# Patient Record
Sex: FEMALE | Race: WHITE | Employment: OTHER | URBAN - METROPOLITAN AREA
[De-identification: names, ages, dates, MRNs, and addresses within clinical notes are randomized per-mention and may not be internally consistent; named-entity substitution may affect disease eponyms.]

---

## 2017-06-08 ENCOUNTER — OFFICE VISIT (OUTPATIENT)
Dept: INTERNAL MEDICINE | Age: 67
End: 2017-06-08
Payer: MEDICARE

## 2017-06-08 ENCOUNTER — NURSE ONLY (OUTPATIENT)
Dept: INTERNAL MEDICINE | Age: 67
End: 2017-06-08

## 2017-06-08 VITALS
BODY MASS INDEX: 26.62 KG/M2 | HEIGHT: 61 IN | HEART RATE: 96 BPM | DIASTOLIC BLOOD PRESSURE: 74 MMHG | WEIGHT: 141 LBS | SYSTOLIC BLOOD PRESSURE: 121 MMHG

## 2017-06-08 DIAGNOSIS — Z23 NEED FOR PNEUMOCOCCAL VACCINATION: ICD-10-CM

## 2017-06-08 DIAGNOSIS — Z00.00 ROUTINE GENERAL MEDICAL EXAMINATION AT A HEALTH CARE FACILITY: ICD-10-CM

## 2017-06-08 DIAGNOSIS — Z71.89 ACP (ADVANCE CARE PLANNING): Primary | ICD-10-CM

## 2017-06-08 DIAGNOSIS — Z78.0 ASYMPTOMATIC MENOPAUSAL STATE: ICD-10-CM

## 2017-06-08 DIAGNOSIS — Z13.9 SCREENING: ICD-10-CM

## 2017-06-08 DIAGNOSIS — Z11.4 SCREENING FOR HIV (HUMAN IMMUNODEFICIENCY VIRUS): ICD-10-CM

## 2017-06-08 DIAGNOSIS — Z13.6 SCREENING FOR CARDIOVASCULAR CONDITION: ICD-10-CM

## 2017-06-08 DIAGNOSIS — Z23 NEED FOR ZOSTAVAX ADMINISTRATION: ICD-10-CM

## 2017-06-08 DIAGNOSIS — R73.9 HYPERGLYCEMIA: Primary | ICD-10-CM

## 2017-06-08 DIAGNOSIS — Z13.1 SCREENING FOR DIABETES MELLITUS (DM): ICD-10-CM

## 2017-06-08 DIAGNOSIS — Z23 NEED FOR TETANUS BOOSTER: ICD-10-CM

## 2017-06-08 DIAGNOSIS — Z72.89 OTHER PROBLEMS RELATED TO LIFESTYLE: ICD-10-CM

## 2017-06-08 DIAGNOSIS — M25.50 ARTHRALGIA, UNSPECIFIED JOINT: ICD-10-CM

## 2017-06-08 LAB — HBA1C MFR BLD: 5.9 %

## 2017-06-08 PROCEDURE — 90670 PCV13 VACCINE IM: CPT | Performed by: INTERNAL MEDICINE

## 2017-06-08 PROCEDURE — G0009 ADMIN PNEUMOCOCCAL VACCINE: HCPCS | Performed by: INTERNAL MEDICINE

## 2017-06-08 PROCEDURE — G0402 INITIAL PREVENTIVE EXAM: HCPCS | Performed by: INTERNAL MEDICINE

## 2017-06-08 PROCEDURE — 83036 HEMOGLOBIN GLYCOSYLATED A1C: CPT | Performed by: INTERNAL MEDICINE

## 2017-06-08 ASSESSMENT — LIFESTYLE VARIABLES
HOW OFTEN DURING THE LAST YEAR HAVE YOU FAILED TO DO WHAT WAS NORMALLY EXPECTED FROM YOU BECAUSE OF DRINKING: 0
HOW OFTEN DURING THE LAST YEAR HAVE YOU FOUND THAT YOU WERE NOT ABLE TO STOP DRINKING ONCE YOU HAD STARTED: 0
HAS A RELATIVE, FRIEND, DOCTOR, OR ANOTHER HEALTH PROFESSIONAL EXPRESSED CONCERN ABOUT YOUR DRINKING OR SUGGESTED YOU CUT DOWN: 0
HOW OFTEN DURING THE LAST YEAR HAVE YOU HAD A FEELING OF GUILT OR REMORSE AFTER DRINKING: 0
HOW OFTEN DURING THE LAST YEAR HAVE YOU NEEDED AN ALCOHOLIC DRINK FIRST THING IN THE MORNING TO GET YOURSELF GOING AFTER A NIGHT OF HEAVY DRINKING: 0
AUDIT-C TOTAL SCORE: 2
AUDIT TOTAL SCORE: 2
HOW OFTEN DO YOU HAVE A DRINK CONTAINING ALCOHOL: 2
HOW OFTEN DURING THE LAST YEAR HAVE YOU BEEN UNABLE TO REMEMBER WHAT HAPPENED THE NIGHT BEFORE BECAUSE YOU HAD BEEN DRINKING: 0
HAVE YOU OR SOMEONE ELSE BEEN INJURED AS A RESULT OF YOUR DRINKING: 0
HOW MANY STANDARD DRINKS CONTAINING ALCOHOL DO YOU HAVE ON A TYPICAL DAY: 0
HOW OFTEN DO YOU HAVE SIX OR MORE DRINKS ON ONE OCCASION: 0

## 2017-06-08 ASSESSMENT — ANXIETY QUESTIONNAIRES: GAD7 TOTAL SCORE: 0

## 2017-07-05 ENCOUNTER — HOSPITAL ENCOUNTER (OUTPATIENT)
Dept: MAMMOGRAPHY | Age: 67
Discharge: HOME | End: 2017-07-05
Payer: MEDICARE

## 2017-07-05 DIAGNOSIS — Z78.0 ASYMPTOMATIC MENOPAUSAL STATE: ICD-10-CM

## 2017-07-05 PROCEDURE — 77080 DXA BONE DENSITY AXIAL: CPT

## 2017-12-06 ENCOUNTER — HOSPITAL ENCOUNTER (OUTPATIENT)
Age: 67
Setting detail: SPECIMEN
Discharge: HOME | End: 2017-12-06
Payer: MEDICARE

## 2017-12-06 DIAGNOSIS — Z13.6 SCREENING FOR CARDIOVASCULAR CONDITION: ICD-10-CM

## 2017-12-06 DIAGNOSIS — Z11.4 SCREENING FOR HIV (HUMAN IMMUNODEFICIENCY VIRUS): ICD-10-CM

## 2017-12-06 DIAGNOSIS — Z72.89 OTHER PROBLEMS RELATED TO LIFESTYLE: ICD-10-CM

## 2017-12-06 LAB
CHOLESTEROL/HDL RATIO: 2.7
CHOLESTEROL: 235 MG/DL
HDLC SERPL-MCNC: 86 MG/DL
HEPATITIS C ANTIBODY: NONREACTIVE
HIV AG/AB: NONREACTIVE
LDL CHOLESTEROL: 127 MG/DL (ref 0–130)
TRIGL SERPL-MCNC: 108 MG/DL
VLDLC SERPL CALC-MCNC: ABNORMAL MG/DL (ref 1–30)

## 2017-12-06 PROCEDURE — 36415 COLL VENOUS BLD VENIPUNCTURE: CPT

## 2017-12-06 PROCEDURE — 80061 LIPID PANEL: CPT

## 2017-12-06 PROCEDURE — 86803 HEPATITIS C AB TEST: CPT

## 2017-12-06 PROCEDURE — 87389 HIV-1 AG W/HIV-1&-2 AB AG IA: CPT

## 2017-12-13 ENCOUNTER — PATIENT MESSAGE (OUTPATIENT)
Dept: INTERNAL MEDICINE | Age: 67
End: 2017-12-13

## 2017-12-13 ENCOUNTER — OFFICE VISIT (OUTPATIENT)
Dept: INTERNAL MEDICINE | Age: 67
End: 2017-12-13
Payer: MEDICARE

## 2017-12-13 VITALS
HEART RATE: 79 BPM | DIASTOLIC BLOOD PRESSURE: 79 MMHG | SYSTOLIC BLOOD PRESSURE: 133 MMHG | HEIGHT: 61 IN | WEIGHT: 136 LBS | BODY MASS INDEX: 25.68 KG/M2

## 2017-12-13 DIAGNOSIS — R73.03 PREDIABETES: ICD-10-CM

## 2017-12-13 DIAGNOSIS — Z12.31 ENCOUNTER FOR SCREENING MAMMOGRAM FOR BREAST CANCER: ICD-10-CM

## 2017-12-13 DIAGNOSIS — D57.3 SICKLE CELL TRAIT (HCC): Primary | ICD-10-CM

## 2017-12-13 PROCEDURE — 1123F ACP DISCUSS/DSCN MKR DOCD: CPT | Performed by: INTERNAL MEDICINE

## 2017-12-13 PROCEDURE — 1090F PRES/ABSN URINE INCON ASSESS: CPT | Performed by: INTERNAL MEDICINE

## 2017-12-13 PROCEDURE — 99213 OFFICE O/P EST LOW 20 MIN: CPT | Performed by: INTERNAL MEDICINE

## 2017-12-13 PROCEDURE — G8419 CALC BMI OUT NRM PARAM NOF/U: HCPCS | Performed by: INTERNAL MEDICINE

## 2017-12-13 PROCEDURE — G8427 DOCREV CUR MEDS BY ELIG CLIN: HCPCS | Performed by: INTERNAL MEDICINE

## 2017-12-13 PROCEDURE — 4004F PT TOBACCO SCREEN RCVD TLK: CPT | Performed by: INTERNAL MEDICINE

## 2017-12-13 PROCEDURE — 4040F PNEUMOC VAC/ADMIN/RCVD: CPT | Performed by: INTERNAL MEDICINE

## 2017-12-13 PROCEDURE — G8484 FLU IMMUNIZE NO ADMIN: HCPCS | Performed by: INTERNAL MEDICINE

## 2017-12-13 PROCEDURE — G8399 PT W/DXA RESULTS DOCUMENT: HCPCS | Performed by: INTERNAL MEDICINE

## 2017-12-13 PROCEDURE — 3017F COLORECTAL CA SCREEN DOC REV: CPT | Performed by: INTERNAL MEDICINE

## 2017-12-13 PROCEDURE — 3014F SCREEN MAMMO DOC REV: CPT | Performed by: INTERNAL MEDICINE

## 2017-12-13 RX ORDER — TETANUS AND DIPHTHERIA TOXOIDS ADSORBED 2; 2 [LF]/.5ML; [LF]/.5ML
0.5 INJECTION INTRAMUSCULAR ONCE
Qty: 0.5 ML | Refills: 0 | Status: CANCELLED | OUTPATIENT
Start: 2017-12-13 | End: 2017-12-13

## 2017-12-13 NOTE — PATIENT INSTRUCTIONS
Please call clinic and schedule follow up in six (6) months, [phone], option 3.    -Order for mammogram faxed to scheduling, they will contact patient with an appt original order given to patient along with scheduling phone number     Vern Durant

## 2017-12-13 NOTE — PROGRESS NOTES
[facility]  [facility] [address] [facility]  [address]  Dept: [phone]      Today's Date: 12/13/2017  Patient Name: Orlando Carrion  Patient's age: 79 y.o., 1950  2[birth year]     CHIEF COMPLAINT:    Chief Complaint   Patient presents with    Diabetes     6 month follow up   [facility] Maintenance     Tdap, Zostavax, Flu, ands Mammogram due       History Obtained From:  patient    HISTORY OF PRESENT ILLNESS:      The patient is a 79 y.o. old  female and is here to Follow-up for sickle cell trait. She is doing well and denies any fatigue, leg swelling, chest pain or palpitation. She is on multivitamins and her A1c is 5.9. Patient Active Problem List   Diagnosis    Sickle cell trait ([facility] [address]       Past Medical History:   has a past medical history of Breast mass; MVP (mitral valve prolapse); and Wears glasses. Past Surgical History:   has a past surgical history that includes Tubal ligation; Tonsillectomy and adenoidectomy; Colonoscopy (06/08/2015); and other surgical history (Left, 06/08/2015). Medications:    Current Outpatient Prescriptions   Medication Sig Dispense Refill    Calcium-Magnesium-Vitamin D - MG-MG-UNIT TB24 Take 1 tablet by mouth daily 90 tablet 2    Glucosamine-Chondroitin-Vit D3 3089-8230-507 MG-MG-UNIT PACK Take 1 tablet by mouth daily 90 each 2    Multiple Vitamin (MULTI VITAMIN) TABS Take 1 tablet by mouth daily 90 tablet 1    ibuprofen (ADVIL;MOTRIN) 800 MG tablet Take 800 mg by mouth every 6 hours as needed for Pain      Psyllium-Calcium (METAMUCIL PLUS CALCIUM) CAPS Take 1 capsule by mouth daily Take with 8 oz water. Increase water intake. Increase to BID after 2 weeks 60 capsule 1    docusate sodium (COLACE) 100 MG capsule Take 1 capsule by mouth 2 times daily Take while on narcotics.   Decrease or discontinue for loose BMs 50 capsule 1    BREWERS YEAST PO Take by mouth daily WILL STOP 6/5/2015      vaccination in [facility]. Will update in chart. · Bert Quinones received counseling on the following healthy behaviors: exercise and medication adherence  · Discussed use, benefit, and side effects of prescribed medications. Barriers to medication compliance addressed. All patient questions answered. Pt voiced understanding. · The return visit should be in 6 months      Williams Cavanaugh MD  Attending and Faculty Internal Medicine  [address] [place] [address]  [address]  Dept: [phone]  12/13/17      This note is created with the assistance of a speech-recognition program. While intending to generate a document that actually reflects the content of the visit, the document can still have some mistakes which may not have been identified and corrected by editing.

## 2018-01-24 ENCOUNTER — HOSPITAL ENCOUNTER (OUTPATIENT)
Dept: MAMMOGRAPHY | Age: 68
Discharge: HOME | End: 2018-01-24
Payer: MEDICARE

## 2018-01-24 DIAGNOSIS — Z12.31 ENCOUNTER FOR SCREENING MAMMOGRAM FOR BREAST CANCER: ICD-10-CM

## 2018-01-24 PROCEDURE — 77063 BREAST TOMOSYNTHESIS BI: CPT

## 2018-01-26 DIAGNOSIS — R92.8 ABNORMAL MAMMOGRAM: Primary | ICD-10-CM

## 2018-01-31 ENCOUNTER — HOSPITAL ENCOUNTER (OUTPATIENT)
Dept: ULTRASOUND IMAGING | Age: 68
Discharge: HOME | End: 2018-02-02
Payer: MEDICARE

## 2018-01-31 ENCOUNTER — HOSPITAL ENCOUNTER (OUTPATIENT)
Dept: MAMMOGRAPHY | Age: 68
Discharge: HOME | End: 2018-02-02
Payer: MEDICARE

## 2018-01-31 DIAGNOSIS — R92.8 ABNORMAL MAMMOGRAM: ICD-10-CM

## 2018-01-31 PROCEDURE — G0279 TOMOSYNTHESIS, MAMMO: HCPCS

## 2019-02-20 ENCOUNTER — TELEPHONE (OUTPATIENT)
Dept: INTERNAL MEDICINE | Age: 69
End: 2019-02-20

## 2019-02-20 DIAGNOSIS — Z12.31 SCREENING MAMMOGRAM, ENCOUNTER FOR: ICD-10-CM

## 2019-02-20 DIAGNOSIS — R92.8 ABNORMAL MAMMOGRAM: Primary | ICD-10-CM

## 2019-07-03 ENCOUNTER — TELEPHONE (OUTPATIENT)
Dept: INTERNAL MEDICINE | Age: 69
End: 2019-07-03

## 2019-07-03 DIAGNOSIS — R92.8 ABNORMAL MAMMOGRAM: Primary | ICD-10-CM

## 2019-07-11 ENCOUNTER — HOSPITAL ENCOUNTER (OUTPATIENT)
Dept: MAMMOGRAPHY | Age: 69
Discharge: HOME | End: 2019-07-13
Payer: MEDICARE

## 2019-07-11 DIAGNOSIS — Z12.31 SCREENING MAMMOGRAM, ENCOUNTER FOR: ICD-10-CM

## 2019-07-11 PROCEDURE — 77063 BREAST TOMOSYNTHESIS BI: CPT

## 2020-07-31 ENCOUNTER — OFFICE VISIT (OUTPATIENT)
Dept: INTERNAL MEDICINE | Age: 70
End: 2020-07-31
Payer: MEDICARE

## 2020-07-31 ENCOUNTER — HOSPITAL ENCOUNTER (OUTPATIENT)
Age: 70
Setting detail: SPECIMEN
Discharge: HOME | End: 2020-07-31
Payer: MEDICARE

## 2020-07-31 VITALS — BODY MASS INDEX: 26.26 KG/M2 | WEIGHT: 139 LBS | SYSTOLIC BLOOD PRESSURE: 130 MMHG | DIASTOLIC BLOOD PRESSURE: 80 MMHG

## 2020-07-31 PROCEDURE — G8427 DOCREV CUR MEDS BY ELIG CLIN: HCPCS | Performed by: STUDENT IN AN ORGANIZED HEALTH CARE EDUCATION/TRAINING PROGRAM

## 2020-07-31 PROCEDURE — 4040F PNEUMOC VAC/ADMIN/RCVD: CPT | Performed by: STUDENT IN AN ORGANIZED HEALTH CARE EDUCATION/TRAINING PROGRAM

## 2020-07-31 PROCEDURE — G8417 CALC BMI ABV UP PARAM F/U: HCPCS | Performed by: STUDENT IN AN ORGANIZED HEALTH CARE EDUCATION/TRAINING PROGRAM

## 2020-07-31 PROCEDURE — 99213 OFFICE O/P EST LOW 20 MIN: CPT | Performed by: STUDENT IN AN ORGANIZED HEALTH CARE EDUCATION/TRAINING PROGRAM

## 2020-07-31 PROCEDURE — 3017F COLORECTAL CA SCREEN DOC REV: CPT | Performed by: STUDENT IN AN ORGANIZED HEALTH CARE EDUCATION/TRAINING PROGRAM

## 2020-07-31 PROCEDURE — 1090F PRES/ABSN URINE INCON ASSESS: CPT | Performed by: STUDENT IN AN ORGANIZED HEALTH CARE EDUCATION/TRAINING PROGRAM

## 2020-07-31 PROCEDURE — 1123F ACP DISCUSS/DSCN MKR DOCD: CPT | Performed by: STUDENT IN AN ORGANIZED HEALTH CARE EDUCATION/TRAINING PROGRAM

## 2020-07-31 PROCEDURE — 4004F PT TOBACCO SCREEN RCVD TLK: CPT | Performed by: STUDENT IN AN ORGANIZED HEALTH CARE EDUCATION/TRAINING PROGRAM

## 2020-07-31 PROCEDURE — G8399 PT W/DXA RESULTS DOCUMENT: HCPCS | Performed by: STUDENT IN AN ORGANIZED HEALTH CARE EDUCATION/TRAINING PROGRAM

## 2020-07-31 PROCEDURE — 99211 OFF/OP EST MAY X REQ PHY/QHP: CPT | Performed by: INTERNAL MEDICINE

## 2020-07-31 RX ORDER — LORATADINE 10 MG/1
10 TABLET ORAL NIGHTLY PRN
Qty: 30 TABLET | Refills: 2 | Status: SHIPPED | OUTPATIENT
Start: 2020-07-31 | End: 2021-09-30 | Stop reason: ALTCHOICE

## 2020-07-31 ASSESSMENT — PATIENT HEALTH QUESTIONNAIRE - PHQ9
SUM OF ALL RESPONSES TO PHQ QUESTIONS 1-9: 0
SUM OF ALL RESPONSES TO PHQ9 QUESTIONS 1 & 2: 0
2. FEELING DOWN, DEPRESSED OR HOPELESS: 0
1. LITTLE INTEREST OR PLEASURE IN DOING THINGS: 0
SUM OF ALL RESPONSES TO PHQ QUESTIONS 1-9: 0

## 2020-07-31 NOTE — PATIENT INSTRUCTIONS
Return To Clinic 8/14/2020 . After Visit Summary  given and reviewed. It is very important for your care that you keep your appointment. If for some reason you are unable to keep your appointment it is equally important that you call our office at [phone] to cancel your appointment and reschedule.  Failure to do so may result in your termination from our practice.     -Bloodwork orders given to patient, they will have them done before their next visit.     -Yamile Umana

## 2020-07-31 NOTE — PROGRESS NOTES
hours as needed for Pain      Psyllium-Calcium (METAMUCIL PLUS CALCIUM) CAPS Take 1 capsule by mouth daily Take with 8 oz water. Increase water intake. Increase to BID after 2 weeks 60 capsule 1    docusate sodium (COLACE) 100 MG capsule Take 1 capsule by mouth 2 times daily Take while on narcotics. Decrease or discontinue for loose BMs 50 capsule 1    BREWERS YEAST PO Take by mouth daily WILL STOP 6/5/2015       No current facility-administered medications on file prior to visit. SOCIAL HISTORY    Reviewed and no change from previous record. Kelsey Kaplan  reports that she has been smoking cigarettes.  She has never used smokeless tobacco.    FAMILY HISTORY:    Reviewed and No change from previous visit    REVIEW OF SYSTEMS:    ENT: See HPI  Respiratory: no cough, shortness of breath, or wheezing  Cardiovascular: no chest pain or dyspnea on exertion  Gastrointestinal: no abdominal pain, black or bloody stools  Neurological: no TIA or stroke symptoms  Endocrine: negative  Genito-Urinary: no dysuria, trouble voiding, or hematuria  Musculoskeletal: negative  Dermatological: negative    PHYSICAL EXAM:      Vitals:    07/31/20 1342   BP: (!) 147/90     General appearance - alert, well appearing, and in no distress  Mental status - alert, oriented to person, place, and time  Eyes - pupils equal and reactive, extraocular eye movements intact  Nose - normal and patent, no erythema, discharge or polyps  Chest - clear to auscultation, no wheezes, rales or rhonchi, symmetric air entry  Heart - normal rate, regular rhythm, normal S1, S2, no murmurs, rubs, clicks or gallops  Abdomen - soft, nontender, nondistended, no masses or organomegaly  Musculoskeletal - no joint tenderness, deformity or swelling  Extremities - peripheral pulses normal, no pedal edema, no clubbing or cyanosis  Skin - normal coloration and turgor, no rashes, no suspicious skin lesions noted    LABORATORY FINDINGS:    CBC:  Lab Results   Component Value Date    WBC 7.7 01/19/2015    HGB 13.1 01/19/2015     01/19/2015     BMP:    Lab Results   Component Value Date     01/19/2015    K 3.9 01/19/2015     01/19/2015    CO2 24 01/19/2015    BUN 12 01/19/2015    CREATININE 0.60 01/19/2015    GLUCOSE 98 01/19/2015     HEMOGLOBIN A1C:   Lab Results   Component Value Date    LABA1C 5.9 06/08/2017     MICROALBUMIN URINE: No results found for: MICROALBUR  FASTING LIPID PANEL:  Lab Results   Component Value Date    CHOL 235 (H) 12/06/2017    HDL 86 12/06/2017    TRIG 108 12/06/2017     LIVER PROFILE:No results found for: ALT, AST, PROT, BILITOT, BILIDIR, LABALBU   THYROID FUNCTION:   Lab Results   Component Value Date    TSH 2.01 02/27/2015      URINE ANALYSIS: No results found for: LABURIN  ASSESSMENT AND PLAN:    1. Allergic rhinoconjunctivitis    - loratadine (CLARITIN) 10 MG tablet; Take 1 tablet by mouth nightly as needed (for congestion, rhinorrhea and tearing during an allergic reation)  Dispense: 30 tablet; Refill: 2    2. Suspected COVID-19 virus infection    - COVID-19 Ambulatory; Future      FOLLOW UP:       1. Esperanza Wadsworth received counseling on the following healthy behaviors: avoid going to places that trigger allergic reactions. 2.  Patient given educational materials - see patient instructions  3. Discussed use, benefit, and side effects of prescribed medications. Barriers to medication compliance addressed. All patient questions answered. Pt voiced understanding. 4.  Reviewed prior labs and health maintenance  5. Continue current medications, diet and exercise.       Osmel Deutsch MD  PGY-3 Resident  Internal Medicine  [facility]    7/31/2020, 2:04 PM

## 2020-08-03 LAB — SARS-COV-2, NAA: NOT DETECTED

## 2020-08-04 ENCOUNTER — TELEPHONE (OUTPATIENT)
Dept: PRIMARY CARE CLINIC | Age: 70
End: 2020-08-04

## 2020-08-14 ENCOUNTER — OFFICE VISIT (OUTPATIENT)
Dept: INTERNAL MEDICINE | Age: 70
End: 2020-08-14
Payer: MEDICARE

## 2020-08-14 VITALS
WEIGHT: 137 LBS | BODY MASS INDEX: 25.86 KG/M2 | DIASTOLIC BLOOD PRESSURE: 72 MMHG | HEIGHT: 61 IN | HEART RATE: 85 BPM | SYSTOLIC BLOOD PRESSURE: 118 MMHG

## 2020-08-14 LAB — HBA1C MFR BLD: 5.7 %

## 2020-08-14 PROCEDURE — 3017F COLORECTAL CA SCREEN DOC REV: CPT | Performed by: STUDENT IN AN ORGANIZED HEALTH CARE EDUCATION/TRAINING PROGRAM

## 2020-08-14 PROCEDURE — 99213 OFFICE O/P EST LOW 20 MIN: CPT | Performed by: STUDENT IN AN ORGANIZED HEALTH CARE EDUCATION/TRAINING PROGRAM

## 2020-08-14 PROCEDURE — 1123F ACP DISCUSS/DSCN MKR DOCD: CPT | Performed by: STUDENT IN AN ORGANIZED HEALTH CARE EDUCATION/TRAINING PROGRAM

## 2020-08-14 PROCEDURE — G8427 DOCREV CUR MEDS BY ELIG CLIN: HCPCS | Performed by: STUDENT IN AN ORGANIZED HEALTH CARE EDUCATION/TRAINING PROGRAM

## 2020-08-14 PROCEDURE — 1090F PRES/ABSN URINE INCON ASSESS: CPT | Performed by: STUDENT IN AN ORGANIZED HEALTH CARE EDUCATION/TRAINING PROGRAM

## 2020-08-14 PROCEDURE — 83037 HB GLYCOSYLATED A1C HOME DEV: CPT | Performed by: STUDENT IN AN ORGANIZED HEALTH CARE EDUCATION/TRAINING PROGRAM

## 2020-08-14 PROCEDURE — 4040F PNEUMOC VAC/ADMIN/RCVD: CPT | Performed by: STUDENT IN AN ORGANIZED HEALTH CARE EDUCATION/TRAINING PROGRAM

## 2020-08-14 PROCEDURE — G8399 PT W/DXA RESULTS DOCUMENT: HCPCS | Performed by: STUDENT IN AN ORGANIZED HEALTH CARE EDUCATION/TRAINING PROGRAM

## 2020-08-14 PROCEDURE — 4004F PT TOBACCO SCREEN RCVD TLK: CPT | Performed by: STUDENT IN AN ORGANIZED HEALTH CARE EDUCATION/TRAINING PROGRAM

## 2020-08-14 PROCEDURE — G8417 CALC BMI ABV UP PARAM F/U: HCPCS | Performed by: STUDENT IN AN ORGANIZED HEALTH CARE EDUCATION/TRAINING PROGRAM

## 2020-08-14 PROCEDURE — 99211 OFF/OP EST MAY X REQ PHY/QHP: CPT | Performed by: INTERNAL MEDICINE

## 2020-08-14 NOTE — PROGRESS NOTES
Attending Physician Statement  I have discussed the care of Rj Castaneda including pertinent history and exam findings,  with the resident. I have reviewed the key elements of all parts of the encounter with the resident. I agree with the assessment, plan and orders as documented by the resident.   (GE Modifier)    Dennie Redhead, MD FASN  Attending Physician, [address] [place] [address], Internal Medicine Residency Program  [address] [place] [address]  8/14/2020, 4:09 PM

## 2020-08-14 NOTE — PATIENT INSTRUCTIONS
Referral for GI sent to [facility]  they will call pt for appt, copy of referral with number and address given to pt. Pt should call referral number if not heard from within a couple of weeks. An After Visit Summary was printed and given to the patient.   CB

## 2020-08-14 NOTE — PROGRESS NOTES
[facility]    Progress Note    Date of patient's visit: 8/14/2020  YOB: 1950  Patient's Name:  Valerie Whitman    Patient Care Team:  Brandon Mike MD as PCP - General (Internal Medicine)  Angeline Shields DO as Consulting Physician (General Surgery)    REASON FOR VISIT: New to provider visit    HISTORY OF PRESENT ILLNESS:    History was obtained from the patient. Valerie Whitman is a 71 y.o. is here for a new to provider visit. She has no complaints today. She continues to smoke over half pack of cigarettes per week. She drinks about 3 glasses of wine in 1 week. She exercises regularly. She is previously been seen for sickle cell trait and prediabetes. Her former PCP was Dr.N Shasha Douglass. Blood pressure is within normal limits. Patient Active Problem List   Diagnosis    Sickle cell trait (HCC)    Prediabetes       ALLERGIES    No Known Allergies      MEDICATIONS:      Current Outpatient Medications on File Prior to Visit   Medication Sig Dispense Refill    Calcium-Magnesium-Vitamin D - MG-MG-UNIT TB24 Take 1 tablet by mouth daily 90 tablet 2    Multiple Vitamin (MULTI VITAMIN) TABS Take 1 tablet by mouth daily 90 tablet 1    loratadine (CLARITIN) 10 MG tablet Take 1 tablet by mouth nightly as needed (for congestion, rhinorrhea and tearing during an allergic reation) (Patient not taking: Reported on 8/14/2020) 30 tablet 2    Glucosamine-Chondroitin-Vit D3 2898-7802-637 MG-MG-UNIT PACK Take 1 tablet by mouth daily (Patient not taking: Reported on 8/14/2020) 90 each 2    ibuprofen (ADVIL;MOTRIN) 800 MG tablet Take 800 mg by mouth every 6 hours as needed for Pain      Psyllium-Calcium (METAMUCIL PLUS CALCIUM) CAPS Take 1 capsule by mouth daily Take with 8 oz water. Increase water intake.   Increase to BID after 2 weeks (Patient not taking: Reported on 8/14/2020) 60 capsule 1    docusate sodium (COLACE) 100 MG capsule Take 1 capsule by mouth 2 times daily Take while on narcotics. Decrease or discontinue for loose BMs (Patient not taking: Reported on 8/14/2020) 50 capsule 1    BREWERS YEAST PO Take by mouth daily WILL STOP 6/5/2015       No current facility-administered medications on file prior to visit. SOCIAL HISTORY    Reviewed and no change from previous record. Alex Richter  reports that she has been smoking cigarettes. She has a 10.00 pack-year smoking history. She has never used smokeless tobacco.    FAMILY HISTORY:    Reviewed and No change from previous visit    REVIEW OF SYSTEMS:    ENT: negative  Respiratory: no cough, shortness of breath, or wheezing  Cardiovascular: no chest pain or dyspnea on exertion  Gastrointestinal: no abdominal pain, black or bloody stools  Neurological: no TIA or stroke symptoms  Endocrine: negative  Genito-Urinary: no dysuria, trouble voiding, or hematuria  Musculoskeletal: negative  Dermatological: Healing wound on the right ankle after being bitten by yellow jacket bees. PHYSICAL EXAM:      Vitals:    08/14/20 1405   BP: 118/72   Pulse: 85     General appearance - alert, well appearing, and in no distress  Mental status - alert, oriented to person, place, and time  Eyes - pupils equal and reactive, extraocular eye movements intact  Chest - clear to auscultation, no wheezes, rales or rhonchi, symmetric air entry  Heart - normal rate, regular rhythm, normal S1, S2, no murmurs, rubs, clicks or gallops  Abdomen - soft, nontender, nondistended, no masses or organomegaly  Musculoskeletal - no joint tenderness, deformity or swelling  Extremities - peripheral pulses normal, no pedal edema, no clubbing or cyanosis  Skin -scab covered wound over lateral aspect of the right ankle. No discharge or tenderness.     LABORATORY FINDINGS:    CBC:  Lab Results   Component Value Date    WBC 7.7 01/19/2015    HGB 13.1 01/19/2015     01/19/2015     BMP:    Lab Results   Component Value Date

## 2020-09-01 ENCOUNTER — VIRTUAL VISIT (OUTPATIENT)
Dept: INTERNAL MEDICINE | Age: 70
End: 2020-09-01
Payer: MEDICARE

## 2020-09-01 PROCEDURE — G0438 PPPS, INITIAL VISIT: HCPCS | Performed by: NURSE PRACTITIONER

## 2020-09-01 ASSESSMENT — LIFESTYLE VARIABLES
HOW OFTEN DURING THE LAST YEAR HAVE YOU BEEN UNABLE TO REMEMBER WHAT HAPPENED THE NIGHT BEFORE BECAUSE YOU HAD BEEN DRINKING: 0
HAVE YOU OR SOMEONE ELSE BEEN INJURED AS A RESULT OF YOUR DRINKING: 0
HOW MANY STANDARD DRINKS CONTAINING ALCOHOL DO YOU HAVE ON A TYPICAL DAY: 0
HAS A RELATIVE, FRIEND, DOCTOR, OR ANOTHER HEALTH PROFESSIONAL EXPRESSED CONCERN ABOUT YOUR DRINKING OR SUGGESTED YOU CUT DOWN: 0
HOW OFTEN DO YOU HAVE SIX OR MORE DRINKS ON ONE OCCASION: 0
AUDIT TOTAL SCORE: 3
HOW OFTEN DURING THE LAST YEAR HAVE YOU NEEDED AN ALCOHOLIC DRINK FIRST THING IN THE MORNING TO GET YOURSELF GOING AFTER A NIGHT OF HEAVY DRINKING: 0
HOW OFTEN DURING THE LAST YEAR HAVE YOU FOUND THAT YOU WERE NOT ABLE TO STOP DRINKING ONCE YOU HAD STARTED: 0
HOW OFTEN DURING THE LAST YEAR HAVE YOU FAILED TO DO WHAT WAS NORMALLY EXPECTED FROM YOU BECAUSE OF DRINKING: 0
AUDIT-C TOTAL SCORE: 3
HOW OFTEN DO YOU HAVE A DRINK CONTAINING ALCOHOL: 3
HOW OFTEN DURING THE LAST YEAR HAVE YOU HAD A FEELING OF GUILT OR REMORSE AFTER DRINKING: 0

## 2020-09-01 ASSESSMENT — PATIENT HEALTH QUESTIONNAIRE - PHQ9
SUM OF ALL RESPONSES TO PHQ QUESTIONS 1-9: 0
SUM OF ALL RESPONSES TO PHQ QUESTIONS 1-9: 0

## 2020-09-01 NOTE — PATIENT INSTRUCTIONS
Personalized Preventive Plan for Eyal Calvo - 9/1/2020  Medicare offers a range of preventive health benefits. Some of the tests and screenings are paid in full while other may be subject to a deductible, co-insurance, and/or copay. Some of these benefits include a comprehensive review of your medical history including lifestyle, illnesses that may run in your family, and various assessments and screenings as appropriate. After reviewing your medical record and screening and assessments performed today your provider may have ordered immunizations, labs, imaging, and/or referrals for you. A list of these orders (if applicable) as well as your Preventive Care list are included within your After Visit Summary for your review. Other Preventive Recommendations:    · A preventive eye exam performed by an eye specialist is recommended every 1-2 years to screen for glaucoma; cataracts, macular degeneration, and other eye disorders. · A preventive dental visit is recommended every 6 months. · Try to get at least 150 minutes of exercise per week or 10,000 steps per day on a pedometer . · Order or download the FREE \"Exercise & Physical Activity: Your Everyday Guide\" from The [employer] on [employer]. Call 5-706.301.2433 or search The [employer] Aging online. · You need 8329-2346 mg of calcium and 8866-0049 IU of vitamin D per day. It is possible to meet your calcium requirement with diet alone, but a vitamin D supplement is usually necessary to meet this goal.  · When exposed to the sun, use a sunscreen that protects against both UVA and UVB radiation with an SPF of 30 or greater. Reapply every 2 to 3 hours or after sweating, drying off with a towel, or swimming. · Always wear a seat belt when traveling in a car. Always wear a helmet when riding a bicycle or motorcycle.       Patient was put on a wait list and will be contacted to schedule their next follow up appointment once the schedule is available. If the patient is in need of an appointment before their next visit please call the office at [phone]. After Visit Summary  mailed to patient.     SL

## 2020-09-01 NOTE — PROGRESS NOTES
Diagnosis Date    Breast mass     LEFT    MVP (mitral valve prolapse)     Wears glasses        Past Surgical History:   Procedure Laterality Date    COLONOSCOPY  06/08/2015    OTHER SURGICAL HISTORY Left 06/08/2015    biopsy of left breast tissue    TONSILLECTOMY AND ADENOIDECTOMY      TUBAL LIGATION           Family History   Problem Relation Age of Onset    Other Father         throat cancer    Hypertension Mother     Diabetes Mother        CareTeam (Including outside providers/suppliers regularly involved in providing care):   Patient Care Team:  Lia Hayward MD as PCP - General (Internal Medicine)  Jama Zuluaga DO as Consulting Physician (General Surgery)    Wt Readings from Last 3 Encounters:   08/14/20 137 lb (62.1 kg)   07/31/20 139 lb (63 kg)   12/13/17 136 lb (61.7 kg)      No flowsheet data found. There is no height or weight on file to calculate BMI. Based upon direct observation of the patient, evaluation of cognition reveals recent and remote memory intact. Wt Readings from Last 3 Encounters:   08/14/20 137 lb (62.1 kg)   07/31/20 139 lb (63 kg)   12/13/17 136 lb (61.7 kg)     Temp Readings from Last 3 Encounters:   06/24/15 99 °F (37.2 °C) (Temporal)   06/08/15 97.7 °F (36.5 °C)   05/13/15 98.1 °F (36.7 °C) (Oral)     BP Readings from Last 3 Encounters:   08/14/20 118/72   07/31/20 130/80   12/13/17 133/79     Pulse Readings from Last 3 Encounters:   08/14/20 85   12/13/17 79   06/08/17 96       Patient's complete Health Risk Assessment and screening values have been reviewed and are found in Flowsheets. The following problems were reviewed today and where indicated follow up appointments were made and/or referrals ordered.     Positive Risk Factor Screenings with Interventions:     Substance Abuse:  Social History     Tobacco History     Smoking Status  Current Some Day Smoker Smoking Frequency  0.25 packs/day for 40 years (10 pk yrs) Smoking Tobacco Type  Cigarettes 72 yrs and older) 12/18/2018    Pneumococcal Conjugate 13-valent ([facility] [place]) 06/08/2017, 10/18/2019    Tdap (Boostrix, Adacel) 06/12/2017    Zoster Live (Zostavax) 06/27/2017        Health Maintenance   Topic Date Due    Shingles Vaccine (2 of 3) 08/22/2017    Colon cancer screen colonoscopy  06/08/2018    Annual Wellness Visit (AWV)  06/23/2019    Pneumococcal 65+ years Vaccine (2 of 2 - PPSV23) 10/18/2020    Breast cancer screen  07/11/2021    A1C test (Diabetic or Prediabetic)  08/14/2021    Lipid screen  12/06/2022    DTaP/Tdap/Td vaccine (2 - Td) 06/12/2027    DEXA (modify frequency per FRAX score)  Completed    Flu vaccine  Completed    Hepatitis C screen  Completed    Hepatitis A vaccine  Aged Out    Hepatitis B vaccine  Aged Out    Hib vaccine  Aged Out    Meningococcal (ACWY) vaccine  Aged Out     Recommendations for [employer] Due: see orders and patient instructions/AVS.  . Recommended screening schedule for the next 5-10 years is provided to the patient in written form: see Patient Instructions/AVS.    Pt reports having order for colonoscopy with plans to call and schedule. Discussed completing shingles vaccine and she is not interested at this time. Blanca Oneil was seen today for medicare awv and health maintenance. Diagnoses and all orders for this visit:    Routine general medical examination at a health care facility  -     [address] [place] [address] is a 71 y.o. female being evaluated by a Virtual Visit (phone) encounter to address concerns as mentioned above. A caregiver was present when appropriate. Due to this being a TeleHealth encounter ([facility] public health emergency), evaluation of the following organ systems was limited: Vitals/Constitutional/EENT/Resp/CV/GI//MS/Neuro/Skin/Heme-Lymph-Imm.   Pursuant to the emergency declaration under the [facility] waiver authority and the Coronavirus Preparedness and Response Supplemental Appropriations Act, this Virtual Visit was conducted with patient's (and/or legal guardian's) consent, to reduce the patient's risk of exposure to COVID-19 and provide necessary medical care. The patient (and/or legal guardian) has also been advised to contact this office for worsening conditions or problems, and seek emergency medical treatment and/or call 911 if deemed necessary. Patient identification was verified at the start of the visit: Yes    Services were provided through phone to substitute for in-person clinic visit. Patient and provider were located at their individual homes. --ANA Mendez CNP on 9/1/2020 at 10:05 AM    An electronic signature was used to authenticate this note.

## 2021-02-01 ENCOUNTER — TELEPHONE (OUTPATIENT)
Dept: INTERNAL MEDICINE | Age: 71
End: 2021-02-01

## 2021-09-30 ENCOUNTER — VIRTUAL VISIT (OUTPATIENT)
Dept: INTERNAL MEDICINE | Age: 71
End: 2021-09-30
Payer: MEDICARE

## 2021-09-30 DIAGNOSIS — Z12.31 BREAST CANCER SCREENING BY MAMMOGRAM: Primary | ICD-10-CM

## 2021-09-30 DIAGNOSIS — Z12.11 SCREEN FOR COLON CANCER: ICD-10-CM

## 2021-09-30 DIAGNOSIS — Z00.00 ROUTINE GENERAL MEDICAL EXAMINATION AT A HEALTH CARE FACILITY: ICD-10-CM

## 2021-09-30 PROCEDURE — 3017F COLORECTAL CA SCREEN DOC REV: CPT | Performed by: NURSE PRACTITIONER

## 2021-09-30 PROCEDURE — 1123F ACP DISCUSS/DSCN MKR DOCD: CPT | Performed by: NURSE PRACTITIONER

## 2021-09-30 PROCEDURE — G0439 PPPS, SUBSEQ VISIT: HCPCS | Performed by: NURSE PRACTITIONER

## 2021-09-30 PROCEDURE — 4040F PNEUMOC VAC/ADMIN/RCVD: CPT | Performed by: NURSE PRACTITIONER

## 2021-09-30 SDOH — ECONOMIC STABILITY: FOOD INSECURITY: WITHIN THE PAST 12 MONTHS, YOU WORRIED THAT YOUR FOOD WOULD RUN OUT BEFORE YOU GOT MONEY TO BUY MORE.: NEVER TRUE

## 2021-09-30 SDOH — ECONOMIC STABILITY: FOOD INSECURITY: WITHIN THE PAST 12 MONTHS, THE FOOD YOU BOUGHT JUST DIDN'T LAST AND YOU DIDN'T HAVE MONEY TO GET MORE.: NEVER TRUE

## 2021-09-30 ASSESSMENT — PATIENT HEALTH QUESTIONNAIRE - PHQ9
SUM OF ALL RESPONSES TO PHQ QUESTIONS 1-9: 0
2. FEELING DOWN, DEPRESSED OR HOPELESS: 0
SUM OF ALL RESPONSES TO PHQ QUESTIONS 1-9: 0
10. IF YOU CHECKED OFF ANY PROBLEMS, HOW DIFFICULT HAVE THESE PROBLEMS MADE IT FOR YOU TO DO YOUR WORK, TAKE CARE OF THINGS AT HOME, OR GET ALONG WITH OTHER PEOPLE: 0
9. THOUGHTS THAT YOU WOULD BE BETTER OFF DEAD, OR OF HURTING YOURSELF: 0
7. TROUBLE CONCENTRATING ON THINGS, SUCH AS READING THE NEWSPAPER OR WATCHING TELEVISION: 0
DEPRESSION UNABLE TO ASSESS: PT REFUSES
3. TROUBLE FALLING OR STAYING ASLEEP: 0
5. POOR APPETITE OR OVEREATING: 0
4. FEELING TIRED OR HAVING LITTLE ENERGY: 0
8. MOVING OR SPEAKING SO SLOWLY THAT OTHER PEOPLE COULD HAVE NOTICED. OR THE OPPOSITE, BEING SO FIGETY OR RESTLESS THAT YOU HAVE BEEN MOVING AROUND A LOT MORE THAN USUAL: 0
SUM OF ALL RESPONSES TO PHQ9 QUESTIONS 1 & 2: 0
SUM OF ALL RESPONSES TO PHQ QUESTIONS 1-9: 0
1. LITTLE INTEREST OR PLEASURE IN DOING THINGS: 0
6. FEELING BAD ABOUT YOURSELF - OR THAT YOU ARE A FAILURE OR HAVE LET YOURSELF OR YOUR FAMILY DOWN: 0

## 2021-09-30 ASSESSMENT — LIFESTYLE VARIABLES
HOW MANY STANDARD DRINKS CONTAINING ALCOHOL DO YOU HAVE ON A TYPICAL DAY: 0
HOW OFTEN DO YOU HAVE SIX OR MORE DRINKS ON ONE OCCASION: 0
AUDIT TOTAL SCORE: 1
HAVE YOU OR SOMEONE ELSE BEEN INJURED AS A RESULT OF YOUR DRINKING: 0
HOW OFTEN DO YOU HAVE A DRINK CONTAINING ALCOHOL: 1
AUDIT-C TOTAL SCORE: 1
HOW OFTEN DURING THE LAST YEAR HAVE YOU FOUND THAT YOU WERE NOT ABLE TO STOP DRINKING ONCE YOU HAD STARTED: 0
HOW OFTEN DURING THE LAST YEAR HAVE YOU HAD A FEELING OF GUILT OR REMORSE AFTER DRINKING: 0
HOW OFTEN DURING THE LAST YEAR HAVE YOU NEEDED AN ALCOHOLIC DRINK FIRST THING IN THE MORNING TO GET YOURSELF GOING AFTER A NIGHT OF HEAVY DRINKING: 0
HAS A RELATIVE, FRIEND, DOCTOR, OR ANOTHER HEALTH PROFESSIONAL EXPRESSED CONCERN ABOUT YOUR DRINKING OR SUGGESTED YOU CUT DOWN: 0
HOW OFTEN DURING THE LAST YEAR HAVE YOU FAILED TO DO WHAT WAS NORMALLY EXPECTED FROM YOU BECAUSE OF DRINKING: 0
HOW OFTEN DURING THE LAST YEAR HAVE YOU BEEN UNABLE TO REMEMBER WHAT HAPPENED THE NIGHT BEFORE BECAUSE YOU HAD BEEN DRINKING: 0

## 2021-09-30 ASSESSMENT — SOCIAL DETERMINANTS OF HEALTH (SDOH): HOW HARD IS IT FOR YOU TO PAY FOR THE VERY BASICS LIKE FOOD, HOUSING, MEDICAL CARE, AND HEATING?: NOT HARD AT ALL

## 2021-09-30 NOTE — PATIENT INSTRUCTIONS
Personalized Preventive Plan for Zeina Samuels - 9/30/2021  Medicare offers a range of preventive health benefits. Some of the tests and screenings are paid in full while other may be subject to a deductible, co-insurance, and/or copay. Some of these benefits include a comprehensive review of your medical history including lifestyle, illnesses that may run in your family, and various assessments and screenings as appropriate. After reviewing your medical record and screening and assessments performed today your provider may have ordered immunizations, labs, imaging, and/or referrals for you. A list of these orders (if applicable) as well as your Preventive Care list are included within your After Visit Summary for your review. Other Preventive Recommendations:    · A preventive eye exam performed by an eye specialist is recommended every 1-2 years to screen for glaucoma; cataracts, macular degeneration, and other eye disorders. · A preventive dental visit is recommended every 6 months. · Try to get at least 150 minutes of exercise per week or 10,000 steps per day on a pedometer . · Order or download the FREE \"Exercise & Physical Activity: Your Everyday Guide\" from The "[employer]. Call 5-623.258.4803 or search The [employer] Data on Aging online. · You need 5675-2965 mg of calcium and 1879-7110 IU of vitamin D per day. It is possible to meet your calcium requirement with diet alone, but a vitamin D supplement is usually necessary to meet this goal.  · When exposed to the sun, use a sunscreen that protects against both UVA and UVB radiation with an SPF of 30 or greater. Reapply every 2 to 3 hours or after sweating, drying off with a towel, or swimming. · Always wear a seat belt when traveling in a car. Always wear a helmet when riding a bicycle or motorcycle.

## 2021-09-30 NOTE — PROGRESS NOTES
Medicare Annual Wellness Visit  Are Name: Sabrina Lopez Date: 2021   MRN: [id number] Sex: Female   Age: 70 y.o. Ethnicity: Non- / Non    : 1950 Race: White (non-)      Connie Crawford is here for Medicare AWV (vaccines declined )    Screenings for behavioral, psychosocial and functional/safety risks, and cognitive dysfunction are all negative except as indicated below. These results, as well as other patient data from the [address] form, are documented in Flowsheets linked to this Encounter. No Known Allergies      Prior to Visit Medications    Medication Sig Taking? Authorizing Provider   Calcium-Magnesium-Vitamin D - MG-MG-UNIT TB24 Take 1 tablet by mouth daily Yes Doug Dumont MD   Multiple Vitamin (MULTI VITAMIN) TABS Take 1 tablet by mouth daily Yes Doug Dumont MD   ibuprofen (ADVIL;MOTRIN) 800 MG tablet Take 800 mg by mouth every 6 hours as needed for Pain Yes Historical Provider, MD   Psyllium-Calcium (METAMUCIL PLUS CALCIUM) CAPS Take 1 capsule by mouth daily Take with 8 oz water. Increase water intake.   Increase to BID after 2 weeks Yes Sergio Treviño DO         Past Medical History:   Diagnosis Date    Breast mass     LEFT    MVP (mitral valve prolapse)     Wears glasses        Past Surgical History:   Procedure Laterality Date    COLONOSCOPY  2015    OTHER SURGICAL HISTORY Left 2015    biopsy of left breast tissue    TONSILLECTOMY AND ADENOIDECTOMY      TUBAL LIGATION           Family History   Problem Relation Age of Onset    Other Father         throat cancer    Hypertension Mother     Diabetes Mother        CareTeam (Including outside providers/suppliers regularly involved in providing care):   Patient Care Team:  Marta Ruggiero MD as PCP - General (Internal Medicine)  ANA Goodman Sa - CNP as PCP - Marcela Thompsonled Provider  Orestes Huff DO as Consulting Physician (General Surgery)    Blayne Readings from Last 3 Encounters:   08/14/20 137 lb (62.1 kg)   07/31/20 139 lb (63 kg)   12/13/17 136 lb (61.7 kg)      Patient-Reported Vitals 9/30/2021   Patient-Reported Weight 138 lb   Patient-Reported Systolic 400   Patient-Reported Diastolic 80      There is no height or weight on file to calculate BMI. Based upon direct observation of the patient, evaluation of cognition reveals recent and remote memory intact. Wt Readings from Last 3 Encounters:   08/14/20 137 lb (62.1 kg)   07/31/20 139 lb (63 kg)   12/13/17 136 lb (61.7 kg)     Temp Readings from Last 3 Encounters:   06/24/15 99 °F (37.2 °C) (Temporal)   06/08/15 97.7 °F (36.5 °C)   05/13/15 98.1 °F (36.7 °C) (Oral)     BP Readings from Last 3 Encounters:   08/14/20 118/72   07/31/20 130/80   12/13/17 133/79     Pulse Readings from Last 3 Encounters:   08/14/20 85   12/13/17 79   06/08/17 96       Patient's complete Health Risk Assessment and screening values have been reviewed and are found in Flowsheets. The following problems were reviewed today and where indicated follow up appointments were made and/or referrals ordered.     Positive Risk Factor Screenings with Interventions:       Depression:  Depression Unable to Assess: Pt refuses  PHQ-2 Score: 0  PHQ-9 Total Score: 0    Severity:1-4 = minimal depression, 5-9 = mild depression, 10-14 = moderate depression, 15-19 = moderately severe depression, 20-27 = severe depression     Substance History:  Social History     Tobacco History     Smoking Status  Current Some Day Smoker Smoking Frequency  0.25 packs/day for 40 years (10 pk yrs) Smoking Tobacco Type  Cigarettes    Smokeless Tobacco Use  Never Used    Tobacco Comment  using e ciggs/ sometimes actual cig          Alcohol History     Alcohol Use Status  Yes Drinks/Week  3 Standard drinks or equivalent per week Amount  2.5 standard drinks of alcohol/wk Comment  wine          Drug Use     Drug Use Status  No          Sexual Activity     Sexually Active  Yes               Alcohol Screening: Audit-C Score: 1  Total Score: 1    A score of 8 or more is associated with harmful or hazardous drinking. A score of 13 or more in women, and 15 or more in men, is likely to indicate alcohol dependence. Substance Abuse Interventions:  · Tobacco abuse:  patient is not ready to work toward tobacco cessation at this time    [address] and ACP:  General  In general, how would you say your health is?: Very Good  In the past 7 days, have you experienced any of the following?  New or Increased Pain, New or Increased Fatigue, Loneliness, Social Isolation, Stress or Anger?: None of These  Do you get the social and emotional support that you need?: Yes  Do you have a Living Will?: Yes  Advance Directives     Power of  Living Will ACP-Advance Directive ACP-Power of     Not on File Not on File Not on File Not on File      General Health Risk Interventions:  ·         Personalized Preventive Plan   Current Health Maintenance Status  Immunization History   Administered Date(s) Administered    Influenza Virus Vaccine 02/03/2015    Influenza, High Dose (Fluzone 65 yrs and older) 10/19/2017    Influenza, MDCK Quadv, IM, PF (Flucelvax 2 yrs and older) 10/18/2019    Influenza, Triv, inactivated, subunit, adjuvanted, IM (Fluad 65 yrs and older) 12/18/2018    Pneumococcal Conjugate 13-valent (Idella Washburn) 06/08/2017, 10/18/2019    Tdap (Boostrix, Adacel) 06/12/2017    Zoster Live (Zostavax) 06/27/2017        Health Maintenance   Topic Date Due    Colon cancer screen colonoscopy  06/08/2018    Breast cancer screen  07/11/2021    A1C test (Diabetic or Prediabetic)  08/14/2021    Flu vaccine (1) 09/01/2021    Annual Wellness Visit (AWV)  09/02/2021    Shingles Vaccine (2 of 3) 09/30/2022 (Originally 8/22/2017)    Pneumococcal 65+ years Vaccine (2 of 2 - PPSV23) 10/03/2022 (Originally 10/18/2020)    COVID-19 Vaccine (1) 10/03/2022 (Originally 9/21/1962)   Dossijay Saeed Lipid screen  12/06/2022    DTaP/Tdap/Td vaccine (2 - Td or Tdap) 06/12/2027    DEXA (modify frequency per FRAX score)  Completed    Hepatitis C screen  Completed    Hepatitis A vaccine  Aged Out    Hepatitis B vaccine  Aged Out    Hib vaccine  Aged Out    Meningococcal (ACWY) vaccine  Aged Out     Recommendations for Revegy Due: see orders and patient instructions/AVS.  . Recommended screening schedule for the next 5-10 years is provided to the patient in written form: see Patient Instructions/AVS.    Ayesha Clark was seen today for medicare awv. Diagnoses and all orders for this visit:    Breast cancer screening by mammogram    Routine general medical examination at a health care facility               Cesar Boss is a 70 y.o. female being evaluated by a Virtual Visit (phone) encounter to address concerns as mentioned above. A caregiver was present when appropriate. Due to this being a TeleHealth encounter (During HOJVX-03 public health emergency), evaluation of the following organ systems was limited: Vitals/Constitutional/EENT/Resp/CV/GI//MS/Neuro/Skin/Heme-Lymph-Imm. Pursuant to the emergency declaration under the [facility], [address] [place] [address] authority and the [employer] Act, this Virtual Visit was conducted with patient's (and/or legal guardian's) consent, to reduce the patient's risk of exposure to COVID-19 and provide necessary medical care. The patient (and/or legal guardian) has also been advised to contact this office for worsening conditions or problems, and seek emergency medical treatment and/or call 911 if deemed necessary. Patient identification was verified at the start of the visit: Yes    Services were provided through phone to substitute for in-person clinic visit. Patient and provider were located at their individual homes.     --ANA Valencia CNP on 9/30/2021 at 9:10 AM    An electronic signature was used to authenticate this note.

## 2021-10-21 ENCOUNTER — HOSPITAL ENCOUNTER (OUTPATIENT)
Age: 71
Setting detail: SPECIMEN
Discharge: HOME | End: 2021-10-21
Payer: MEDICARE

## 2021-10-21 DIAGNOSIS — Z00.00 ROUTINE GENERAL MEDICAL EXAMINATION AT A HEALTH CARE FACILITY: ICD-10-CM

## 2021-10-21 LAB
ESTIMATED AVERAGE GLUCOSE: 126 MG/DL
HBA1C MFR BLD: 6 % (ref 4–6)

## 2021-11-23 ENCOUNTER — HOSPITAL ENCOUNTER (OUTPATIENT)
Dept: MAMMOGRAPHY | Age: 71
Discharge: HOME | End: 2021-11-25
Payer: MEDICARE

## 2021-11-23 DIAGNOSIS — Z12.31 BREAST CANCER SCREENING BY MAMMOGRAM: ICD-10-CM

## 2021-11-23 PROCEDURE — 77063 BREAST TOMOSYNTHESIS BI: CPT

## 2021-12-17 ENCOUNTER — OFFICE VISIT (OUTPATIENT)
Dept: INTERNAL MEDICINE | Age: 71
End: 2021-12-17
Payer: MEDICARE

## 2021-12-17 VITALS
WEIGHT: 144 LBS | OXYGEN SATURATION: 99 % | DIASTOLIC BLOOD PRESSURE: 88 MMHG | TEMPERATURE: 98.4 F | SYSTOLIC BLOOD PRESSURE: 126 MMHG | HEART RATE: 88 BPM | HEIGHT: 61 IN | BODY MASS INDEX: 27.19 KG/M2

## 2021-12-17 DIAGNOSIS — Z12.11 ENCOUNTER FOR SCREENING COLONOSCOPY: Primary | ICD-10-CM

## 2021-12-17 DIAGNOSIS — R73.03 PREDIABETES: ICD-10-CM

## 2021-12-17 DIAGNOSIS — D57.3 SICKLE CELL TRAIT (HCC): ICD-10-CM

## 2021-12-17 PROBLEM — Z23 NEED FOR INFLUENZA VACCINATION: Status: ACTIVE | Noted: 2021-12-17

## 2021-12-17 PROCEDURE — 1090F PRES/ABSN URINE INCON ASSESS: CPT

## 2021-12-17 PROCEDURE — 4040F PNEUMOC VAC/ADMIN/RCVD: CPT

## 2021-12-17 PROCEDURE — 99211 OFF/OP EST MAY X REQ PHY/QHP: CPT | Performed by: INTERNAL MEDICINE

## 2021-12-17 PROCEDURE — G8482 FLU IMMUNIZE ORDER/ADMIN: HCPCS

## 2021-12-17 PROCEDURE — 4004F PT TOBACCO SCREEN RCVD TLK: CPT

## 2021-12-17 PROCEDURE — 99213 OFFICE O/P EST LOW 20 MIN: CPT

## 2021-12-17 PROCEDURE — 90686 IIV4 VACC NO PRSV 0.5 ML IM: CPT | Performed by: INTERNAL MEDICINE

## 2021-12-17 PROCEDURE — G8417 CALC BMI ABV UP PARAM F/U: HCPCS

## 2021-12-17 PROCEDURE — 1123F ACP DISCUSS/DSCN MKR DOCD: CPT

## 2021-12-17 PROCEDURE — G8427 DOCREV CUR MEDS BY ELIG CLIN: HCPCS

## 2021-12-17 PROCEDURE — G8399 PT W/DXA RESULTS DOCUMENT: HCPCS

## 2021-12-17 PROCEDURE — 3017F COLORECTAL CA SCREEN DOC REV: CPT

## 2021-12-17 RX ORDER — CHOLECALCIFEROL (VITAMIN D3) 50 MCG
1 CAPSULE ORAL DAILY
COMMUNITY

## 2021-12-17 ASSESSMENT — ENCOUNTER SYMPTOMS
BLOOD IN STOOL: 0
COUGH: 0
CHOKING: 0
NAUSEA: 0
DIARRHEA: 0
SHORTNESS OF BREATH: 0
VOMITING: 0
BACK PAIN: 0
RHINORRHEA: 0
WHEEZING: 0
SORE THROAT: 0
ABDOMINAL PAIN: 0

## 2021-12-17 NOTE — PROGRESS NOTES
Internal Medicine Clinic New Patient Note    Date of patient's visit: 12/17/2021  Name:  Malachi Robertson  Primary Care Physician: Jakub Choe MD    Reason for visit: First Visit, establish care     HISTORY OF PRESENTING ILLNESS:    History was obtained from the patient. Malachi Robertson is a 70 y.o. is here to establish care. She has no active complaints today. She continues to smoke half pack of cigarettes per week. She exercises regularly. She is being followed up for sickle cell trait and prediabetes. Blood pressure is within normal limits. She is being followed up for prediabetes and her last HbA1c was 6  On 10/21/2021. She recently had a mammography on 11/23/2021 and it was normal.     Patient is due for her colon cancer screening and was referred to [facility] for colonoscopy. PAST MEDICAL HISTORY:          Diagnosis Date    Breast mass     LEFT    MVP (mitral valve prolapse)     Wears glasses        PAST SURGICAL HISTORY:          Procedure Laterality Date    COLONOSCOPY  06/08/2015    OTHER SURGICAL HISTORY Left 06/08/2015    biopsy of left breast tissue    TONSILLECTOMY AND ADENOIDECTOMY      TUBAL LIGATION         ALLERGIES:    No Known Allergies      HOME MEDICATION:      Current Outpatient Medications on File Prior to Visit   Medication Sig Dispense Refill    Calcium-Magnesium-Vitamin D - MG-MG-UNIT TB24 Take 1 tablet by mouth daily 90 tablet 2    Multiple Vitamin (MULTI VITAMIN) TABS Take 1 tablet by mouth daily 90 tablet 1    ibuprofen (ADVIL;MOTRIN) 800 MG tablet Take 800 mg by mouth every 6 hours as needed for Pain      Psyllium-Calcium (METAMUCIL PLUS CALCIUM) CAPS Take 1 capsule by mouth daily Take with 8 oz water. Increase water intake. Increase to BID after 2 weeks 60 capsule 1     No current facility-administered medications on file prior to visit. SOCIAL HISTORY:    TOBACCO:   reports that she has been smoking cigarettes.  She has a 10.00 pack-year smoking history. She has never used smokeless tobacco.  ETOH:   reports current alcohol use of about 2.5 standard drinks of alcohol per week. DRUGS:  reports no history of drug use. OCCUPATION:      FAMILY HISTORY:          Problem Relation Age of Onset    Other Father         throat cancer    Hypertension Mother     Diabetes Mother        REVIEW OF SYSTEMS:    Review of Systems   Constitutional: Negative for appetite change, chills, fatigue, fever and unexpected weight change. HENT: Negative for ear pain, postnasal drip, rhinorrhea and sore throat. Respiratory: Negative for cough, choking, shortness of breath and wheezing. Cardiovascular: Negative for chest pain and leg swelling. Gastrointestinal: Negative for abdominal pain, blood in stool, diarrhea, nausea and vomiting. Genitourinary: Negative for decreased urine volume, difficulty urinating and enuresis. Musculoskeletal: Negative for back pain and joint swelling. Neurological: Negative for syncope, light-headedness and headaches. Psychiatric/Behavioral: The patient is not nervous/anxious. PHYSICAL EXAM:    There were no vitals filed for this visit. Physical Exam  Constitutional:       General: She is not in acute distress. Appearance: She is not ill-appearing. HENT:      Head: Normocephalic. Nose: No rhinorrhea. Eyes:      Conjunctiva/sclera: Conjunctivae normal.      Pupils: Pupils are equal, round, and reactive to light. Cardiovascular:      Rate and Rhythm: Normal rate and regular rhythm. Pulses: Normal pulses. Heart sounds: Normal heart sounds. No murmur heard. Pulmonary:      Effort: No respiratory distress. Breath sounds: No stridor. No wheezing. Abdominal:      General: Bowel sounds are normal. There is no distension. Palpations: Abdomen is soft. Tenderness: There is no abdominal tenderness. There is no guarding.    Neurological:      General: No focal deficit

## 2021-12-17 NOTE — PATIENT INSTRUCTIONS
Patient Education        Influenza (Flu) Vaccine: Care Instructions  Your Care Instructions     Influenza (flu) is an infection in the lungs and breathing passages. It is caused by the influenza virus. There are different strains, or types, of the flu virus every year. The flu comes on quickly. It can cause a cough, stuffy nose, fever, chills, tiredness, and aches and pains. These symptoms may last up to 10 days. The flu can make you feel very sick, but most of the time it doesn't lead to other problems. But it can cause serious problems in people who are older or who have a long-term illness, such as heart disease or diabetes. You can help prevent the flu by getting a flu vaccine every year, as soon as it is available. You cannot get the flu from the vaccine. The vaccine prevents most cases of the flu. But even when the vaccine doesn't prevent the flu, it can make symptoms less severe and reduce the chance of problems from the flu. Sometimes, young children and people who have an immune system problem may have a slight fever or muscle aches or pains 6 to 12 hours after getting the shot. These symptoms usually last 1 or 2 days. Follow-up care is a key part of your treatment and safety. Be sure to make and go to all appointments, and call your doctor if you are having problems. It's also a good idea to know your test results and keep a list of the medicines you take. Who should get the flu vaccine? Everyone age 7 months or older should get a flu vaccine each year. It lowers the chance of getting and spreading the flu. The vaccine is very important for people who are at high risk for getting other health problems from the flu. This includes:  · Anyone 48years of age or older. · People who live in a long-term care center, such as a nursing home. · All children 6 months through 25years of age. · Adults and children 6 months and older who have long-term heart or lung problems, such as asthma.   · Adults and children 6 months and older who needed medical care or were in a hospital during the past year because of diabetes, chronic kidney disease, or a weak immune system (including HIV or AIDS). · Women who will be pregnant during the flu season. · People who have any condition that can make it hard to breathe or swallow (such as a brain injury or muscle disorders). · People who can give the flu to others who are at high risk for problems from the flu. This includes all health care workers and close contacts of people age 72 or older. Who should not get the flu vaccine? The person who gives the vaccine may tell you not to get it if you:  · Have a severe allergy to eggs or any part of the vaccine. · Have had a severe reaction to a flu vaccine in the past.  · Have had Guillain-Barré syndrome (GBS). · Are sick with a fever. (Get the vaccine when symptoms are gone.)  How can you care for yourself at home? · If you or your child has a sore arm or a slight fever after the vaccine, take an over-the-counter pain medicine, such as acetaminophen (Tylenol) or ibuprofen (Advil, Motrin). Read and follow all instructions on the label. Do not give aspirin to anyone younger than 20. It has been linked to Reye syndrome, a serious illness. · Do not take two or more pain medicines at the same time unless the doctor told you to. Many pain medicines have acetaminophen, which is Tylenol. Too much acetaminophen (Tylenol) can be harmful. When should you call for help? Call 911 anytime you think you may need emergency care. For example, call if after getting the flu vaccine:    · You have symptoms of a severe reaction to the flu vaccine. Symptoms of a severe reaction may include:  ? Severe difficulty breathing. ? Sudden raised, red areas (hives) all over your body. ? Severe lightheadedness.    Call your doctor now or seek immediate medical care if after getting the flu vaccine:    · You think you are having a reaction to the flu vaccine, such as a new fever. Watch closely for changes in your health, and be sure to contact your doctor if you have any problems. Where can you learn more? Go to https://FluidnetpeVisionnaire.MADS. org and sign in to your [employer] account. Enter J751 in the [employer] box to learn more about \"Influenza (Flu) Vaccine: Care Instructions. \"     If you do not have an account, please click on the \"Sign Up Now\" link. Current as of: August 31, 2020               Content Version: 13.0  © 4970-1713 Healthwise, Incorporated. Care instructions adapted under license by [facility] If you have questions about a medical condition or this instruction, always ask your healthcare professional. [address] any warranty or liability for your use of this information. Patient was put on a wait list and will be contacted to schedule their next follow up appointment once the schedule is available. If the patient is in need of an appointment before their next visit please call the office at [phone]. After Visit Summary  given and reviewed.     BENNY

## 2022-01-16 PROBLEM — Z23 NEED FOR INFLUENZA VACCINATION: Status: RESOLVED | Noted: 2021-12-17 | Resolved: 2022-01-16

## 2022-12-01 ENCOUNTER — TELEPHONE (OUTPATIENT)
Dept: INTERNAL MEDICINE | Age: 72
End: 2022-12-01

## 2022-12-08 ENCOUNTER — OFFICE VISIT (OUTPATIENT)
Dept: INTERNAL MEDICINE | Age: 72
End: 2022-12-08
Payer: MEDICARE

## 2022-12-08 VITALS
WEIGHT: 149 LBS | TEMPERATURE: 98.8 F | OXYGEN SATURATION: 99 % | BODY MASS INDEX: 28.13 KG/M2 | DIASTOLIC BLOOD PRESSURE: 78 MMHG | HEIGHT: 61 IN | HEART RATE: 93 BPM | SYSTOLIC BLOOD PRESSURE: 114 MMHG

## 2022-12-08 DIAGNOSIS — N95.9 UNSPECIFIED MENOPAUSAL AND PERIMENOPAUSAL DISORDER: ICD-10-CM

## 2022-12-08 DIAGNOSIS — M85.80 OSTEOPENIA, UNSPECIFIED LOCATION: ICD-10-CM

## 2022-12-08 DIAGNOSIS — I34.1 MITRAL VALVE PROLAPSE: ICD-10-CM

## 2022-12-08 DIAGNOSIS — D12.3 ADENOMATOUS POLYP OF TRANSVERSE COLON: ICD-10-CM

## 2022-12-08 DIAGNOSIS — Z72.0 TOBACCO USE: ICD-10-CM

## 2022-12-08 DIAGNOSIS — R73.03 PREDIABETES: ICD-10-CM

## 2022-12-08 DIAGNOSIS — Z13.820 SCREENING FOR OSTEOPOROSIS: ICD-10-CM

## 2022-12-08 DIAGNOSIS — D57.3 SICKLE CELL TRAIT (HCC): ICD-10-CM

## 2022-12-08 DIAGNOSIS — Z00.00 MEDICARE ANNUAL WELLNESS VISIT, SUBSEQUENT: Primary | ICD-10-CM

## 2022-12-08 DIAGNOSIS — Z23 NEEDS FLU SHOT: ICD-10-CM

## 2022-12-08 LAB — HBA1C MFR BLD: 5.8 %

## 2022-12-08 PROCEDURE — 4004F PT TOBACCO SCREEN RCVD TLK: CPT

## 2022-12-08 PROCEDURE — 83037 HB GLYCOSYLATED A1C HOME DEV: CPT

## 2022-12-08 PROCEDURE — G8482 FLU IMMUNIZE ORDER/ADMIN: HCPCS

## 2022-12-08 PROCEDURE — G8399 PT W/DXA RESULTS DOCUMENT: HCPCS

## 2022-12-08 PROCEDURE — 3017F COLORECTAL CA SCREEN DOC REV: CPT

## 2022-12-08 PROCEDURE — 1090F PRES/ABSN URINE INCON ASSESS: CPT

## 2022-12-08 PROCEDURE — 99214 OFFICE O/P EST MOD 30 MIN: CPT

## 2022-12-08 PROCEDURE — 1123F ACP DISCUSS/DSCN MKR DOCD: CPT

## 2022-12-08 PROCEDURE — G0439 PPPS, SUBSEQ VISIT: HCPCS

## 2022-12-08 PROCEDURE — 99211 OFF/OP EST MAY X REQ PHY/QHP: CPT | Performed by: STUDENT IN AN ORGANIZED HEALTH CARE EDUCATION/TRAINING PROGRAM

## 2022-12-08 PROCEDURE — G8417 CALC BMI ABV UP PARAM F/U: HCPCS

## 2022-12-08 PROCEDURE — G8427 DOCREV CUR MEDS BY ELIG CLIN: HCPCS

## 2022-12-08 PROCEDURE — G0008 ADMIN INFLUENZA VIRUS VAC: HCPCS | Performed by: STUDENT IN AN ORGANIZED HEALTH CARE EDUCATION/TRAINING PROGRAM

## 2022-12-08 SDOH — ECONOMIC STABILITY: FOOD INSECURITY: WITHIN THE PAST 12 MONTHS, THE FOOD YOU BOUGHT JUST DIDN'T LAST AND YOU DIDN'T HAVE MONEY TO GET MORE.: NEVER TRUE

## 2022-12-08 SDOH — ECONOMIC STABILITY: FOOD INSECURITY: WITHIN THE PAST 12 MONTHS, YOU WORRIED THAT YOUR FOOD WOULD RUN OUT BEFORE YOU GOT MONEY TO BUY MORE.: NEVER TRUE

## 2022-12-08 ASSESSMENT — ENCOUNTER SYMPTOMS
ABDOMINAL DISTENTION: 0
ABDOMINAL PAIN: 0
SHORTNESS OF BREATH: 0

## 2022-12-08 ASSESSMENT — SOCIAL DETERMINANTS OF HEALTH (SDOH): HOW HARD IS IT FOR YOU TO PAY FOR THE VERY BASICS LIKE FOOD, HOUSING, MEDICAL CARE, AND HEATING?: NOT HARD AT ALL

## 2022-12-08 ASSESSMENT — LIFESTYLE VARIABLES
HOW OFTEN DO YOU HAVE A DRINK CONTAINING ALCOHOL: 2-4 TIMES A MONTH
HOW MANY STANDARD DRINKS CONTAINING ALCOHOL DO YOU HAVE ON A TYPICAL DAY: 1 OR 2

## 2022-12-08 ASSESSMENT — PATIENT HEALTH QUESTIONNAIRE - PHQ9
2. FEELING DOWN, DEPRESSED OR HOPELESS: 0
SUM OF ALL RESPONSES TO PHQ QUESTIONS 1-9: 0
SUM OF ALL RESPONSES TO PHQ9 QUESTIONS 1 & 2: 0
SUM OF ALL RESPONSES TO PHQ QUESTIONS 1-9: 0
SUM OF ALL RESPONSES TO PHQ QUESTIONS 1-9: 0
1. LITTLE INTEREST OR PLEASURE IN DOING THINGS: 0
SUM OF ALL RESPONSES TO PHQ QUESTIONS 1-9: 0

## 2022-12-08 NOTE — PROGRESS NOTES
Vaccine Information Sheet, \"Influenza - Inactivated\"  given to Bria Arnold, or parent/legal guardian of  Bria Arnold and verbalized understanding. Patient responses:    Is the person being vaccinated sick today? No    Does the person to be vaccinated have an allergy to a component of the vaccine? No    Has the person to be vaccinated ever had a reaction to the flu vaccine in the past?  No    Have you ever had Guillian Hartsel Syndrome? No    Flu vaccine given per order. Please see immunization tab. Pt presents with no complaints and is afebrile. Flu vaccine given in left deltoid. Tolerated immunization well, VIS given to patient. No s/sx of reaction at this time.

## 2022-12-08 NOTE — PROGRESS NOTES
Medicare Annual Wellness Visit    Manual Erm is here for Medicare AWV (Last AWV 9/30/2021) and Hyperglycemia (Increased A1C reported by home nurse to 7%)    Assessment & Plan   Medicare annual wellness visit, subsequent      Recommendations for Preventive Services Due: see orders and patient instructions/AVS.  Recommended screening schedule for the next 5-10 years is provided to the patient in written form: see Patient Instructions/AVS.     No follow-ups on file. Subjective       Patient's complete Health Risk Assessment and screening values have been reviewed and are found in Flowsheets. The following problems were reviewed today and where indicated follow up appointments were made and/or referrals ordered. Positive Risk Factor Screenings with Interventions:                        Tobacco Use:  Tobacco Use: High Risk    Smoking Tobacco Use: Some Days    Smokeless Tobacco Use: Never    Passive Exposure: Not on file     E-cigarette/Vaping       Questions Responses    E-cigarette/Vaping Use     Start Date     Passive Exposure     Quit Date     Counseling Given     Comments           Interventions:  Discussed replacing her routine of sitting on porch and having a couple puffs at a time with a straw to simulate the hand-mouth habit. Pt interested in trying to remove cigarettes from her daily routine. See AVS for addional education material  See A/P for any pertinent orders                        Objective   Vitals:    12/08/22 0823   BP: 114/78   Site: Left Upper Arm   Position: Sitting   Cuff Size: Large Adult   Pulse: 93   Temp: 98.8 °F (37.1 °C)   TempSrc: Infrared   SpO2: 99%   Weight: 149 lb (67.6 kg)   Height: 5' 1\" (1.549 m)      Body mass index is 28.15 kg/m². Physical Exam         No Known Allergies  Prior to Visit Medications    Medication Sig Taking?  Authorizing Provider   B Complex-C-Folic Acid (HM VITAMIN B COMPLEX/VITAMIN C) TABS Take 1 tablet by mouth daily Yes Historical Provider, MD Calcium-Magnesium-Vitamin D - MG-MG-UNIT TB24 Take 1 tablet by mouth daily Yes Tamara Santizo MD   Multiple Vitamin (MULTI VITAMIN) TABS Take 1 tablet by mouth daily Yes Tamara Santizo MD   Psyllium-Calcium (METAMUCIL PLUS CALCIUM) CAPS Take 1 capsule by mouth daily Take with 8 oz water. Increase water intake. Increase to BID after 2 weeks Yes Farzad Palma DO       CareTeam (Including outside providers/suppliers regularly involved in providing care):   Patient Care Team:  Shaista Ferreira MD as PCP - General (Internal Medicine)  ANA Telles CNP as PCP - [facility] Empaneled Provider  Haritha Shannon DO as Consulting Physician (General Surgery)     Reviewed and updated this visit:  Allergies  Meds         I, Per Jimenez RN, 12/8/2022, performed the documented evaluation under the direct supervision of the attending physician.

## 2022-12-08 NOTE — PATIENT INSTRUCTIONS
screenings are paid in full while other may be subject to a deductible, co-insurance, and/or copay. Some of these benefits include a comprehensive review of your medical history including lifestyle, illnesses that may run in your family, and various assessments and screenings as appropriate. After reviewing your medical record and screening and assessments performed today your provider may have ordered immunizations, labs, imaging, and/or referrals for you. A list of these orders (if applicable) as well as your Preventive Care list are included within your After Visit Summary for your review. Other Preventive Recommendations:    A preventive eye exam performed by an eye specialist is recommended every 1-2 years to screen for glaucoma; cataracts, macular degeneration, and other eye disorders. A preventive dental visit is recommended every 6 months. Try to get at least 150 minutes of exercise per week or 10,000 steps per day on a pedometer . Order or download the FREE \"Exercise & Physical Activity: Your Everyday Guide\" from [employer]. Call 7-484.593.2353 or search [employer] online. You need 3349-6867 mg of calcium and 5711-0431 IU of vitamin D per day. It is possible to meet your calcium requirement with diet alone, but a vitamin D supplement is usually necessary to meet this goal.  When exposed to the sun, use a sunscreen that protects against both UVA and UVB radiation with an SPF of 30 or greater. Reapply every 2 to 3 hours or after sweating, drying off with a towel, or swimming. Always wear a seat belt when traveling in a car. Always wear a helmet when riding a bicycle or motorcycle. Advance Directives: Care Instructions  Overview  An advance directive is a legal way to state your wishes at the end of your life. It tells your family and your doctor what to do if you can't say what you want. There are two main types of advance directives.  You can change them any time your wishes change. Living will. This form tells your family and your doctor your wishes about life support and other treatment. The form is also called a declaration. Medical power of . This form lets you name a person to make treatment decisions for you when you can't speak for yourself. This person is called a health care agent (health care proxy, health care surrogate). The form is also called a durable power of  for health care. If you do not have an advance directive, decisions about your medical care may be made by a family member, or by a doctor or a  who doesn't know you. It may help to think of an advance directive as a gift to the people who care for you. If you have one, they won't have to make tough decisions by themselves. Follow-up care is a key part of your treatment and safety. Be sure to make and go to all appointments, and call your doctor if you are having problems. It's also a good idea to know your test results and keep a list of the medicines you take. What should you include in an advance directive? Many states have a unique advance directive form. (It may ask you to address specific issues.) Or you might use a universal form that's approved by many states. If your form doesn't tell you what to address, it may be hard to know what to include in your advance directive. Use the questions below to help you get started. Who do you want to make decisions about your medical care if you are not able to? What life-support measures do you want if you have a serious illness that gets worse over time or can't be cured? What are you most afraid of that might happen? (Maybe you're afraid of having pain, losing your independence, or being kept alive by machines.)  Where would you prefer to die? (Your home? A hospital? A nursing home?)  Do you want to donate your organs when you die?   Do you want certain Sikhism practices performed before you die?  When should you call for help? Be sure to contact your doctor if you have any questions. Where can you learn more? Go to [email or web address] and sign in to your [employer] account. Enter R264 in the [facility] box to learn more about \"Advance Directives: Care Instructions. \"     If you do not have an account, please click on the \"Sign Up Now\" link. Current as of: June 16, 2022               Content Version: 13.4  © 1312-6899 Healthwise, [employer]. Care instructions adapted under license by [facility] ([facility]). If you have questions about a medical condition or this instruction, always ask your healthcare professional. [address] any warranty or liability for your use of this information. Personalized Preventive Plan for Luis F Alfaro - 12/8/2022  Medicare offers a range of preventive health benefits. Some of the tests and screenings are paid in full while other may be subject to a deductible, co-insurance, and/or copay. Some of these benefits include a comprehensive review of your medical history including lifestyle, illnesses that may run in your family, and various assessments and screenings as appropriate. After reviewing your medical record and screening and assessments performed today your provider may have ordered immunizations, labs, imaging, and/or referrals for you. A list of these orders (if applicable) as well as your Preventive Care list are included within your After Visit Summary for your review. Other Preventive Recommendations:    A preventive eye exam performed by an eye specialist is recommended every 1-2 years to screen for glaucoma; cataracts, macular degeneration, and other eye disorders. A preventive dental visit is recommended every 6 months. Try to get at least 150 minutes of exercise per week or 10,000 steps per day on a pedometer .   Order or download the FREE \"Exercise & Physical Activity: Your Everyday Guide\" from [employer] on [facility]. Call 4-871.627.5962 or search [employer] online. You need 9254-3390 mg of calcium and 0703-5498 IU of vitamin D per day. It is possible to meet your calcium requirement with diet alone, but a vitamin D supplement is usually necessary to meet this goal.  When exposed to the sun, use a sunscreen that protects against both UVA and UVB radiation with an SPF of 30 or greater. Reapply every 2 to 3 hours or after sweating, drying off with a towel, or swimming. Always wear a seat belt when traveling in a car. Always wear a helmet when riding a bicycle or motorcycle.

## 2022-12-08 NOTE — PROGRESS NOTES
Medicare Annual Wellness Visit    Clement Taveras is here for Medicare AWV (Last AWV 9/30/2021), Hyperglycemia (Increased A1C reported by home nurse to 7%), and Health Maintenance (Flu pended, would like to think about Prevnar 20)    Assessment & Plan   Medicare annual wellness visit, subsequent  -     CBC with Auto Differential; Future  -     Comprehensive Metabolic Panel; Future  -     DEXA BONE DENSITY AXIAL SKELETON; Future  Tobacco use  Needs flu shot  -     [place] IMMUNIZ ADMIN,1 SINGLE/COMB VAC/TOXOID  -     Influenza, AFLURIA, (age 1 y+), IM, Preservative Free, 0.5 mL  Mitral valve prolapse  Sickle cell trait (HCC)  Prediabetes  -     POCT glycosylated hemoglobin, home device (HbA1C)  -     Lipid Panel; Future  Osteopenia, unspecified location  -     DEXA BONE DENSITY AXIAL SKELETON; Future  Adenomatous polyp of transverse colon  -     [facility] Colon[facility]  Screening for osteoporosis  -     DEXA BONE DENSITY AXIAL SKELETON; Future  Unspecified menopausal and perimenopausal disorder   -     DEXA BONE DENSITY AXIAL SKELETON; Future      Recommendations for Preventive Services Due: see orders and patient instructions/AVS.  Recommended screening schedule for the next 5-10 years is provided to the patient in written form: see Patient Instructions/AVS.     Return for Medicare Annual Wellness Visit in 1 year. Subjective   67year old female came in for medicare annual wellness visit. Patient has no complaints. Patient is here for annual visit. She stated her home nurse checked her hba1c and it was 7. Recheck of hba1c was 5.8. PMH - MVP, left mass benign, sickle cell trait  Family history - mom - dm, htn. Father - throat cancer  SH - alcohol occasionally, cig - 1 pack/week,   Medication - calcium, multivtamin - vitamin d.     Labs - CMP 2015 - wnl  Lipid 235 cholesterol 2017  Hba1c 6 2021  Tsh 2015 normal  Cbc 2015 - normal    Mammogram - 11/23/21 - BIRADS 1  Vaccination - flu vaccine agreed to.    Patient's complete Health Risk Assessment and screening values have been reviewed and are found in Flowsheets. The following problems were reviewed today and where indicated follow up appointments were made and/or referrals ordered. Positive Risk Factor Screenings with Interventions:                        Tobacco Use:  Tobacco Use: High Risk    Smoking Tobacco Use: Some Days    Smokeless Tobacco Use: Never    Passive Exposure: Not on file     E-cigarette/Vaping       Questions Responses    E-cigarette/Vaping Use     Start Date     Passive Exposure     Quit Date     Counseling Given     Comments           Interventions:  See AVS for addional education material  See A/P for any pertinent orders    Tobacco Use Counseling: Patient was counseled on tobacco cessation. Based upon patient's motivation to change her behavior, the following plan was agreed upon: further evalulation. Educational materials regarding tobacco cessation were provided. Provider spent 2 minutes counseling patient. Physical Exam  Constitutional:       Appearance: Normal appearance. HENT:      Head: Normocephalic and atraumatic. Cardiovascular:      Rate and Rhythm: Normal rate and regular rhythm. Pulses: Normal pulses. Heart sounds: Normal heart sounds. Pulmonary:      Effort: Pulmonary effort is normal.      Breath sounds: Normal breath sounds. Abdominal:      Palpations: Abdomen is soft. Tenderness: There is no abdominal tenderness. Musculoskeletal:      Right lower leg: No edema. Left lower leg: No edema. Neurological:      Mental Status: She is alert. Psychiatric:         Mood and Affect: Mood normal.         Behavior: Behavior normal.         Judgment: Judgment normal.       Review of Systems   Constitutional:  Negative for activity change, appetite change and chills. Respiratory:  Negative for shortness of breath. Gastrointestinal:  Negative for abdominal distention and abdominal pain. Neurological:  Negative for dizziness and headaches. Psychiatric/Behavioral:  Negative for agitation, behavioral problems and confusion. Objective   Vitals:    12/08/22 0823   BP: 114/78   Site: Left Upper Arm   Position: Sitting   Cuff Size: Large Adult   Pulse: 93   Temp: 98.8 °F (37.1 °C)   TempSrc: Infrared   SpO2: 99%   Weight: 149 lb (67.6 kg)   Height: 5' 1\" (1.549 m)      Body mass index is 28.15 kg/m². No Known Allergies  Prior to Visit Medications    Medication Sig Taking? Authorizing Provider   B Complex-C-Folic Acid (HM VITAMIN B COMPLEX/VITAMIN C) TABS Take 1 tablet by mouth daily Yes Historical Provider, MD   Calcium-Magnesium-Vitamin D - MG-MG-UNIT TB24 Take 1 tablet by mouth daily Yes Mayra Sweet MD   Multiple Vitamin (MULTI VITAMIN) TABS Take 1 tablet by mouth daily Yes Mayra Sweet MD   Psyllium-Calcium (METAMUCIL PLUS CALCIUM) CAPS Take 1 capsule by mouth daily Take with 8 oz water. Increase water intake. Increase to BID after 2 weeks Yes Souleymane Leslie, DO       Assessment and plan    Pre-diabetes - hba1c 5.8. no need for metformin. Continue to monitor. Recommended exercise and lifestyple modification   MVP history - stable. No symptoms  Left benign breast mass history - mammogram 2021 - birads 1. Repeat in 2 years  Adenomatous polyps 2015 - due for colonoscopy. Ordered to repeat this year. History of Sickle cell trait - check cbc. Routine work up - cmp, lipid panel  Osteopenia - 2017 dexa scan: repeat ordered to check.       Orders placed this encounter    Orders Placed This Encounter    DEXA BONE DENSITY AXIAL SKELETON     Standing Status:   Future     Standing Expiration Date:   12/8/2023     Order Specific Question:   Reason for exam:     Answer:   hx of osteopenia; screening    Influenza, AFLURIA, (age 1 y+), IM, Preservative Free, 0.5 mL    CBC with Auto Differential     Standing Status:   Future     Standing Expiration Date: 12/8/2023    Comprehensive Metabolic Panel     Standing Status:   Future     Standing Expiration Date:   12/8/2023    Lipid Panel     Standing Status:   Future     Standing Expiration Date:   12/8/2023     Order Specific Question:   Is Patient Fasting?/# of Hours     Answer:   no     Order Specific Question:   Has the patient fasted? Answer:   No    [facility] Scre[facility] Colon[facility]     Referral Priority:   Routine     Referral Type:    Other     Referral Reason:   Specialty Services Required     Number of Visits Requested:   1    POCT glycosylated hemoglobin, home device (HbA1C)    AR IMMUNIZ ADMIN,1 SINGLE/COMB VAC/TOXOID        CareTeam (Including outside providers/suppliers regularly involved in providing care):   Patient Care Team:  Cherylene Rubinstein, MD as PCP - General (Internal Medicine)  ANA Jaimes CNP as PCP - [facility] Empaneled Provider  Wally Pereira DO as Consulting Physician (General Surgery)     Reviewed and updated this visit:  Allergies  Meds  Problems

## 2022-12-08 NOTE — PROGRESS NOTES
Attending Physician Statement  I have discussed the care of Clement Taveras, including pertinent history and exam findings with the resident. I have reviewed the key elements of all parts of the encounter with the resident. I agree with the assessment, and status of the problem list as documented. The plan and orders should include   Orders Placed This Encounter   Procedures    Influenza, AFLURIA, (age 1 y+), IM, Preservative Free, 0.5 mL    CBC with Auto Differential    Comprehensive Metabolic Panel    Lipid Panel    POCT glycosylated hemoglobin, home device (HbA1C)    [place] IMMUNIZ ADMIN,1 SINGLE/COMB VAC/TOXOID    and this was also documented by the resident. The medication list was reviewed with the resident and is up to date. The return visit should be in 3 months . Diagnosis Orders   1. Medicare annual wellness visit, subsequent  CBC with Auto Differential    Comprehensive Metabolic Panel      2. Tobacco use        3. Needs flu shot  OK IMMUNIZ ADMIN,1 SINGLE/COMB VAC/TOXOID    Influenza, AFLURIA, (age 1 y+), IM, Preservative Free, 0.5 mL      4. Mitral valve prolapse        5. Sickle cell trait ([address]        6.  Prediabetes  POCT glycosylated hemoglobin, home device (HbA1C)    Lipid Panel           Tracy Patel MD   Attending Physician, [facility]   Faculty, Internal Medicine Residency Program  [address] [facility]

## 2022-12-09 ENCOUNTER — TELEPHONE (OUTPATIENT)
Dept: SURGERY | Age: 72
End: 2022-12-09

## 2022-12-13 NOTE — TELEPHONE ENCOUNTER
12/13/22- Spoke to patient and she prefers to have this scheduled at [phone]. Sending referral back to PCP office.

## 2024-11-13 ENCOUNTER — TELEPHONE (OUTPATIENT)
Dept: INTERNAL MEDICINE | Age: 74
End: 2024-11-13

## 2025-01-27 ENCOUNTER — OFFICE VISIT (OUTPATIENT)
Dept: INTERNAL MEDICINE | Age: 75
End: 2025-01-27
Payer: MEDICARE

## 2025-01-27 VITALS
OXYGEN SATURATION: 98 % | SYSTOLIC BLOOD PRESSURE: 116 MMHG | WEIGHT: 149 LBS | BODY MASS INDEX: 28.13 KG/M2 | TEMPERATURE: 97.1 F | RESPIRATION RATE: 16 BRPM | HEIGHT: 61 IN | HEART RATE: 88 BPM | DIASTOLIC BLOOD PRESSURE: 72 MMHG

## 2025-01-27 DIAGNOSIS — Z76.89 ENCOUNTER TO ESTABLISH CARE: ICD-10-CM

## 2025-01-27 DIAGNOSIS — R73.03 PREDIABETES: Primary | ICD-10-CM

## 2025-01-27 DIAGNOSIS — Z12.31 BREAST CANCER SCREENING BY MAMMOGRAM: ICD-10-CM

## 2025-01-27 DIAGNOSIS — E78.5 DYSLIPIDEMIA: ICD-10-CM

## 2025-01-27 DIAGNOSIS — Z78.0 POSTMENOPAUSAL: ICD-10-CM

## 2025-01-27 DIAGNOSIS — D57.3 SICKLE CELL TRAIT (HCC): ICD-10-CM

## 2025-01-27 DIAGNOSIS — I34.1 MITRAL VALVE PROLAPSE: ICD-10-CM

## 2025-01-27 DIAGNOSIS — Z12.11 ENCOUNTER FOR SCREENING COLONOSCOPY: ICD-10-CM

## 2025-01-27 LAB — HBA1C MFR BLD: 5.8 %

## 2025-01-27 PROCEDURE — 1090F PRES/ABSN URINE INCON ASSESS: CPT

## 2025-01-27 PROCEDURE — 83036 HEMOGLOBIN GLYCOSYLATED A1C: CPT

## 2025-01-27 PROCEDURE — G8427 DOCREV CUR MEDS BY ELIG CLIN: HCPCS

## 2025-01-27 PROCEDURE — G8419 CALC BMI OUT NRM PARAM NOF/U: HCPCS

## 2025-01-27 PROCEDURE — G8399 PT W/DXA RESULTS DOCUMENT: HCPCS

## 2025-01-27 PROCEDURE — 1159F MED LIST DOCD IN RCRD: CPT

## 2025-01-27 PROCEDURE — 3017F COLORECTAL CA SCREEN DOC REV: CPT

## 2025-01-27 PROCEDURE — 99213 OFFICE O/P EST LOW 20 MIN: CPT

## 2025-01-27 PROCEDURE — 1123F ACP DISCUSS/DSCN MKR DOCD: CPT

## 2025-01-27 PROCEDURE — 4004F PT TOBACCO SCREEN RCVD TLK: CPT

## 2025-01-27 SDOH — ECONOMIC STABILITY: FOOD INSECURITY: WITHIN THE PAST 12 MONTHS, THE FOOD YOU BOUGHT JUST DIDN'T LAST AND YOU DIDN'T HAVE MONEY TO GET MORE.: NEVER TRUE

## 2025-01-27 SDOH — ECONOMIC STABILITY: FOOD INSECURITY: WITHIN THE PAST 12 MONTHS, YOU WORRIED THAT YOUR FOOD WOULD RUN OUT BEFORE YOU GOT MONEY TO BUY MORE.: NEVER TRUE

## 2025-01-27 ASSESSMENT — PATIENT HEALTH QUESTIONNAIRE - PHQ9
10. IF YOU CHECKED OFF ANY PROBLEMS, HOW DIFFICULT HAVE THESE PROBLEMS MADE IT FOR YOU TO DO YOUR WORK, TAKE CARE OF THINGS AT HOME, OR GET ALONG WITH OTHER PEOPLE: NOT DIFFICULT AT ALL
6. FEELING BAD ABOUT YOURSELF - OR THAT YOU ARE A FAILURE OR HAVE LET YOURSELF OR YOUR FAMILY DOWN: NOT AT ALL
SUM OF ALL RESPONSES TO PHQ QUESTIONS 1-9: 0
3. TROUBLE FALLING OR STAYING ASLEEP: NOT AT ALL
1. LITTLE INTEREST OR PLEASURE IN DOING THINGS: NOT AT ALL
SUM OF ALL RESPONSES TO PHQ QUESTIONS 1-9: 0
2. FEELING DOWN, DEPRESSED OR HOPELESS: NOT AT ALL
7. TROUBLE CONCENTRATING ON THINGS, SUCH AS READING THE NEWSPAPER OR WATCHING TELEVISION: NOT AT ALL
SUM OF ALL RESPONSES TO PHQ QUESTIONS 1-9: 0
5. POOR APPETITE OR OVEREATING: NOT AT ALL
4. FEELING TIRED OR HAVING LITTLE ENERGY: NOT AT ALL
8. MOVING OR SPEAKING SO SLOWLY THAT OTHER PEOPLE COULD HAVE NOTICED. OR THE OPPOSITE, BEING SO FIGETY OR RESTLESS THAT YOU HAVE BEEN MOVING AROUND A LOT MORE THAN USUAL: NOT AT ALL
SUM OF ALL RESPONSES TO PHQ QUESTIONS 1-9: 0
9. THOUGHTS THAT YOU WOULD BE BETTER OFF DEAD, OR OF HURTING YOURSELF: NOT AT ALL
SUM OF ALL RESPONSES TO PHQ9 QUESTIONS 1 & 2: 0

## 2025-01-27 ASSESSMENT — ENCOUNTER SYMPTOMS
RHINORRHEA: 0
ABDOMINAL PAIN: 0
SHORTNESS OF BREATH: 0
DIARRHEA: 0
CHOKING: 0
CHEST TIGHTNESS: 0
TROUBLE SWALLOWING: 0
SORE THROAT: 0
CONSTIPATION: 0
BACK PAIN: 0

## 2025-01-27 NOTE — PROGRESS NOTES
Patient Care Team:  Gloria Munoz MD as PCP - General (Internal Medicine)  Colleen Ye DO as Consulting Physician (General Surgery)  ================================================================    REASON FOR VISIT/CHIEF COMPLAINT:  Established New Doctor (Patient presents as New to Provider. Patient is doing well and has no complaints.)    HISTORY OF PRESENTING ILLNESS:  History was obtained from: patient, electronic medical record. Thuy Rodriguez a 74 y.o. is here to establish care.  Patient has past medical history of mitral valve prolapse.  During this visit patient denies any shortness of breath, chest pain, palpitations.  Patient denies any leg swelling.  Patient states that mitral valve prolapse was diagnosed in her teens and since then she has been careful and take prophylaxis before dental procedure.She does not follow with any cardiologist.    Patient also has history of sickle cell trait according to hemoglobin electrophoresis.  Patient denies any uncontrollable bleeding, any blood in the urine, blood in the stool.    According to DEXA scan in 2017 patient has osteopenia.FRAX score was not available.  Patient is taking calcium and vitamin D supplementation.Patient denies any recent falls, fractures and difficulty ambulation.    She has history of lumpectomy in 2015 of the left breast.  It showed proliferative fibrocystic change with mild ductal epithelial hyperplasia, sclerosing adenosis, apocrine metaplasia.Repeat mammogram of bilateral breast in 2019 showed no mammographic evidence of malignancy.    Patient also has history of prediabetes.Hemoglobin A1c in the clinic was 5.6%.    Health maintenance:  -Patient is able to walk around the house and climb the flight of stairs without any difficulty or getting short of breath.  Patient does not use any cane or walker to ambulate.  -Patient smokes 1 cigarette/day and has cut down from 3 cigarettes/day.Patient drinks a glass of wine in a week.

## 2025-01-27 NOTE — PROGRESS NOTES
Attending Physician Statement  I have discussed the care of Thuy Harman, including pertinent history and exam findings with the resident. I have reviewed the key elements of all parts of the encounter with the resident. I agree with the assessment, and status of the problem list as documented. The plan and orders should include   Orders Placed This Encounter   Procedures    REJI DIGITAL SCREEN UNILATERAL LEFT    DEXA BONE DENSITY AXIAL SKELETON    CBC with Auto Differential    Comprehensive Metabolic Panel    Lipid Panel    Thierry Wolff MD, Gastroenterology, [place]    POCT glycosylated hemoglobin (Hb A1C)    and this was also documented by the resident..The medication list was reviewed with the resident and is up to date.      Diagnosis Orders   1. Prediabetes  POCT glycosylated hemoglobin (Hb A1C)      2. Mitral valve prolapse        3. Sickle cell trait (HCC)  CBC with Auto Differential      4. Breast cancer screening by mammogram  [facility] DIGITAL SCREEN UNILATERAL LEFT      5. Encounter for screening colonoscopy  Thierry Wolff MD, Gastroenterology, [place]      6. Encounter to establish care  Comprehensive Metabolic Panel      7. Postmenopausal  DEXA BONE DENSITY AXIAL SKELETON      8. Dyslipidemia  Lipid Panel           Keena Shelby MD   Attending Physician, [place] [facility]   Faculty, Internal Medicine Residency Program  [facility]

## 2025-01-28 ENCOUNTER — PREP FOR PROCEDURE (OUTPATIENT)
Dept: GASTROENTEROLOGY | Age: 75
End: 2025-01-28

## 2025-01-28 DIAGNOSIS — Z12.11 ENCOUNTER FOR SCREENING COLONOSCOPY: ICD-10-CM

## 2025-01-28 RX ORDER — SODIUM, POTASSIUM,MAG SULFATES 17.5-3.13G
1 SOLUTION, RECONSTITUTED, ORAL ORAL ONCE
Qty: 1 EACH | Refills: 0 | Status: SHIPPED | OUTPATIENT
Start: 2025-01-28 | End: 2025-01-28

## 2025-01-28 NOTE — TELEPHONE ENCOUNTER
Procedure scheduled/Dr Erazo  Procedure: colonoscopy  Dx: screen  Date:05-23-25  Time:8:30 am / arrival 7:00 am  [facility]: [facility] phone call: ns  Bowel Prep instructions given:  suprep  In office/via phone: via phone/mail  Clearance needed:na  GLP-1:  na

## 2025-02-27 PROBLEM — Z12.11 ENCOUNTER FOR SCREENING COLONOSCOPY: Status: RESOLVED | Noted: 2025-01-28 | Resolved: 2025-02-27

## 2025-05-16 ENCOUNTER — HOSPITAL ENCOUNTER (OUTPATIENT)
Dept: MAMMOGRAPHY | Age: 75
Discharge: HOME | End: 2025-05-18
Payer: MEDICARE

## 2025-05-16 ENCOUNTER — HOSPITAL ENCOUNTER (OUTPATIENT)
Age: 75
Discharge: HOME | End: 2025-05-16
Payer: MEDICARE

## 2025-05-16 VITALS — WEIGHT: 149 LBS | HEIGHT: 62 IN | BODY MASS INDEX: 27.42 KG/M2

## 2025-05-16 DIAGNOSIS — D57.3 SICKLE CELL TRAIT: ICD-10-CM

## 2025-05-16 DIAGNOSIS — E78.5 DYSLIPIDEMIA: ICD-10-CM

## 2025-05-16 DIAGNOSIS — Z12.31 BREAST CANCER SCREENING BY MAMMOGRAM: ICD-10-CM

## 2025-05-16 DIAGNOSIS — Z76.89 ENCOUNTER TO ESTABLISH CARE: ICD-10-CM

## 2025-05-16 LAB
ALBUMIN SERPL-MCNC: 4.3 G/DL (ref 3.5–5.2)
ALBUMIN/GLOB SERPL: 1.5 {RATIO} (ref 1–2.5)
ALP SERPL-CCNC: 52 U/L (ref 35–104)
ALT SERPL-CCNC: 17 U/L (ref 10–35)
ANION GAP SERPL CALCULATED.3IONS-SCNC: 11 MMOL/L (ref 9–16)
AST SERPL-CCNC: 18 U/L (ref 10–35)
BASOPHILS # BLD: 0.12 K/UL (ref 0–0.2)
BASOPHILS NFR BLD: 1 % (ref 0–2)
BILIRUB SERPL-MCNC: 0.3 MG/DL (ref 0–1.2)
BUN SERPL-MCNC: 22 MG/DL (ref 8–23)
CALCIUM SERPL-MCNC: 9.8 MG/DL (ref 8.6–10.4)
CHLORIDE SERPL-SCNC: 105 MMOL/L (ref 98–107)
CHOLEST SERPL-MCNC: 199 MG/DL (ref 0–199)
CHOLESTEROL/HDL RATIO: 3.2
CO2 SERPL-SCNC: 24 MMOL/L (ref 20–31)
CREAT SERPL-MCNC: 1 MG/DL (ref 0.6–0.9)
EOSINOPHIL # BLD: 0.15 K/UL (ref 0–0.44)
EOSINOPHILS RELATIVE PERCENT: 2 % (ref 1–4)
ERYTHROCYTE [DISTWIDTH] IN BLOOD BY AUTOMATED COUNT: 13.8 % (ref 11.8–14.4)
GFR, ESTIMATED: 59 ML/MIN/1.73M2
GLUCOSE SERPL-MCNC: 84 MG/DL (ref 74–99)
HCT VFR BLD AUTO: 34.8 % (ref 36.3–47.1)
HDLC SERPL-MCNC: 63 MG/DL
HGB BLD-MCNC: 11.8 G/DL (ref 11.9–15.1)
IMM GRANULOCYTES # BLD AUTO: <0.03 K/UL (ref 0–0.3)
IMM GRANULOCYTES NFR BLD: 0 %
LDLC SERPL CALC-MCNC: 123 MG/DL (ref 0–100)
LYMPHOCYTES NFR BLD: 1.69 K/UL (ref 1.1–3.7)
LYMPHOCYTES RELATIVE PERCENT: 19 % (ref 24–43)
MCH RBC QN AUTO: 30.6 PG (ref 25.2–33.5)
MCHC RBC AUTO-ENTMCNC: 33.9 G/DL (ref 28.4–34.8)
MCV RBC AUTO: 90.2 FL (ref 82.6–102.9)
MONOCYTES NFR BLD: 1.01 K/UL (ref 0.1–1.2)
MONOCYTES NFR BLD: 11 % (ref 3–12)
NEUTROPHILS NFR BLD: 67 % (ref 36–65)
NEUTS SEG NFR BLD: 6.04 K/UL (ref 1.5–8.1)
NRBC BLD-RTO: 0 PER 100 WBC
PLATELET # BLD AUTO: 297 K/UL (ref 138–453)
PMV BLD AUTO: 11.9 FL (ref 8.1–13.5)
POTASSIUM SERPL-SCNC: 4.1 MMOL/L (ref 3.7–5.3)
PROT SERPL-MCNC: 7.2 G/DL (ref 6.6–8.7)
RBC # BLD AUTO: 3.86 M/UL (ref 3.95–5.11)
SODIUM SERPL-SCNC: 140 MMOL/L (ref 136–145)
TRIGL SERPL-MCNC: 65 MG/DL
VLDLC SERPL CALC-MCNC: 13 MG/DL (ref 1–30)
WBC OTHER # BLD: 9 K/UL (ref 3.5–11.3)

## 2025-05-16 PROCEDURE — 80061 LIPID PANEL: CPT

## 2025-05-16 PROCEDURE — 80053 COMPREHEN METABOLIC PANEL: CPT

## 2025-05-16 PROCEDURE — 36415 COLL VENOUS BLD VENIPUNCTURE: CPT

## 2025-05-16 PROCEDURE — 77063 BREAST TOMOSYNTHESIS BI: CPT

## 2025-05-16 PROCEDURE — 85025 COMPLETE CBC W/AUTO DIFF WBC: CPT

## 2025-05-29 ENCOUNTER — RESULTS FOLLOW-UP (OUTPATIENT)
Age: 75
End: 2025-05-29

## 2025-07-03 ENCOUNTER — TELEPHONE (OUTPATIENT)
Age: 75
End: 2025-07-03

## 2025-07-28 ENCOUNTER — OFFICE VISIT (OUTPATIENT)
Age: 75
End: 2025-07-28
Payer: MEDICARE

## 2025-07-28 ENCOUNTER — HOSPITAL ENCOUNTER (OUTPATIENT)
Age: 75
Setting detail: SPECIMEN
Discharge: HOME | End: 2025-07-28

## 2025-07-28 VITALS
HEIGHT: 62 IN | DIASTOLIC BLOOD PRESSURE: 70 MMHG | WEIGHT: 144 LBS | BODY MASS INDEX: 26.5 KG/M2 | TEMPERATURE: 98.1 F | HEART RATE: 77 BPM | SYSTOLIC BLOOD PRESSURE: 122 MMHG | OXYGEN SATURATION: 96 %

## 2025-07-28 DIAGNOSIS — Z00.00 ANNUAL WELLNESS VISIT: Primary | ICD-10-CM

## 2025-07-28 DIAGNOSIS — R73.03 PREDIABETES: ICD-10-CM

## 2025-07-28 DIAGNOSIS — Z00.00 ANNUAL WELLNESS VISIT: ICD-10-CM

## 2025-07-28 LAB
ANION GAP SERPL CALCULATED.3IONS-SCNC: 13 MMOL/L (ref 9–16)
BUN SERPL-MCNC: 18 MG/DL (ref 8–23)
CALCIUM SERPL-MCNC: 9.8 MG/DL (ref 8.6–10.4)
CHLORIDE SERPL-SCNC: 110 MMOL/L (ref 98–107)
CO2 SERPL-SCNC: 22 MMOL/L (ref 20–31)
CREAT SERPL-MCNC: 1 MG/DL (ref 0.6–0.9)
GFR, ESTIMATED: 59 ML/MIN/1.73M2
GLUCOSE SERPL-MCNC: 85 MG/DL (ref 74–99)
HBA1C MFR BLD: 6 %
POTASSIUM SERPL-SCNC: 4.1 MMOL/L (ref 3.7–5.3)
SODIUM SERPL-SCNC: 145 MMOL/L (ref 136–145)

## 2025-07-28 PROCEDURE — 99213 OFFICE O/P EST LOW 20 MIN: CPT

## 2025-07-28 PROCEDURE — 1159F MED LIST DOCD IN RCRD: CPT

## 2025-07-28 PROCEDURE — G8427 DOCREV CUR MEDS BY ELIG CLIN: HCPCS

## 2025-07-28 PROCEDURE — 1123F ACP DISCUSS/DSCN MKR DOCD: CPT

## 2025-07-28 PROCEDURE — 1090F PRES/ABSN URINE INCON ASSESS: CPT

## 2025-07-28 PROCEDURE — G8399 PT W/DXA RESULTS DOCUMENT: HCPCS

## 2025-07-28 PROCEDURE — 4004F PT TOBACCO SCREEN RCVD TLK: CPT

## 2025-07-28 PROCEDURE — 83036 HEMOGLOBIN GLYCOSYLATED A1C: CPT | Performed by: HOSPITALIST

## 2025-07-28 PROCEDURE — G8419 CALC BMI OUT NRM PARAM NOF/U: HCPCS

## 2025-07-28 PROCEDURE — 3017F COLORECTAL CA SCREEN DOC REV: CPT

## 2025-07-28 SDOH — ECONOMIC STABILITY: FOOD INSECURITY: WITHIN THE PAST 12 MONTHS, THE FOOD YOU BOUGHT JUST DIDN'T LAST AND YOU DIDN'T HAVE MONEY TO GET MORE.: NEVER TRUE

## 2025-07-28 SDOH — ECONOMIC STABILITY: FOOD INSECURITY: WITHIN THE PAST 12 MONTHS, YOU WORRIED THAT YOUR FOOD WOULD RUN OUT BEFORE YOU GOT MONEY TO BUY MORE.: NEVER TRUE

## 2025-07-28 ASSESSMENT — ENCOUNTER SYMPTOMS
CHEST TIGHTNESS: 0
SHORTNESS OF BREATH: 0
VOMITING: 0
NAUSEA: 0
CONSTIPATION: 0
DIARRHEA: 0
COUGH: 0
CHOKING: 0

## 2025-07-28 ASSESSMENT — PATIENT HEALTH QUESTIONNAIRE - PHQ9
SUM OF ALL RESPONSES TO PHQ QUESTIONS 1-9: 0
2. FEELING DOWN, DEPRESSED OR HOPELESS: NOT AT ALL
1. LITTLE INTEREST OR PLEASURE IN DOING THINGS: NOT AT ALL
SUM OF ALL RESPONSES TO PHQ QUESTIONS 1-9: 0

## 2025-07-28 NOTE — PROGRESS NOTES
Attending Physician Statement  I have discussed the care of Thuy Harman, including pertinent history and exam findings with the resident. I have reviewed the key elements of all parts of the encounter with the resident. I agree with the assessment, and status of the problem list as documented. The plan and orders should include   Orders Placed This Encounter   Procedures    Basic Metabolic Panel    and this was also documented by the resident. The medication list was reviewed with the resident and is up to date. The return visit should be in 6 months .    Israel Prabhakar MD  Attending Physician,  Department of Internal Medicine  [facility] Internal Medicine  [facility]      7/28/2025, 3:18 PM

## 2025-07-28 NOTE — PROGRESS NOTES
Patient Care Team:  Gloria Munoz MD as PCP - General (Internal Medicine)  Colleen Ye DO as Consulting Physician (General Surgery)  ================================================================    REASON FOR VISIT/CHIEF COMPLAINT:  Medicare AWV (No questions or concerns today.)    HISTORY OF PRESENTING ILLNESS:  History was obtained from: patient, electronic medical record. Thuy Rodriguez a 74 y.o. is here for a annual wellness visit.  She has significant past medical history of:  Mitral valve prolapse  Sickle cell trait  Osteopenia  history of lumpectomy in 2015 of the left breast     Patient denies any complaints of shortness of breath, chest pain, palpitation and leg swelling.  Her only medications are multivitamins, vitamin D and calcium for osteopenia.Patient was due for her colonoscopy but was not able to tolerate the bowel prep.  Her labs showed BUNs/creatinine 22/1.  It was suggestive of AAKASH secondary to diarrhea from the bowel prep.  Patient was encouraged to increase oral intake and will repeat BMP.  Patient was advised to schedule colonoscopy again and let the provider know about this episode.    DEXA scan in 2017 patient has osteopenia.Patient is taking calcium and vitamin D supplementation.Patient denies any recent falls, fractures and difficulty ambulation.Will repeat DEXA scan.    Patient also has history of sickle cell trait according to hemoglobin electrophoresis.Hemoglobin is stable on lab review.    She has history of lumpectomy in 2015 of the left breast. It showed proliferative fibrocystic change with mild ductal epithelial hyperplasia, sclerosing adenosis, apocrine metaplasia.Repeat mammogram of bilateral breast in 2025 shows no mammographic evidence of malignancy.    Health maintenance:  -Patient is able to walk around the house and climb the flight of stairs without any difficulty or getting short of breath.  Patient does not use any cane or walker to ambulate.  -Patient

## 2025-07-30 DIAGNOSIS — N18.31 STAGE 3A CHRONIC KIDNEY DISEASE (HCC): Primary | ICD-10-CM
